# Patient Record
Sex: MALE | Race: WHITE | Employment: OTHER | URBAN - METROPOLITAN AREA
[De-identification: names, ages, dates, MRNs, and addresses within clinical notes are randomized per-mention and may not be internally consistent; named-entity substitution may affect disease eponyms.]

---

## 2017-02-23 RX ORDER — CARVEDILOL 6.25 MG/1
TABLET ORAL
Qty: 60 TABLET | Refills: 6 | Status: SHIPPED | OUTPATIENT
Start: 2017-02-23 | End: 2017-05-30 | Stop reason: SDUPTHER

## 2017-02-27 ENCOUNTER — OFFICE VISIT (OUTPATIENT)
Dept: CARDIOLOGY CLINIC | Age: 82
End: 2017-02-27

## 2017-02-27 VITALS
WEIGHT: 211 LBS | BODY MASS INDEX: 31.25 KG/M2 | OXYGEN SATURATION: 97 % | HEIGHT: 69 IN | HEART RATE: 70 BPM | DIASTOLIC BLOOD PRESSURE: 74 MMHG | SYSTOLIC BLOOD PRESSURE: 128 MMHG

## 2017-02-27 DIAGNOSIS — I63.9 CEREBRAL INFARCTION, UNSPECIFIED MECHANISM (HCC): ICD-10-CM

## 2017-02-27 DIAGNOSIS — I25.10 CORONARY ARTERY DISEASE INVOLVING NATIVE HEART WITHOUT ANGINA PECTORIS, UNSPECIFIED VESSEL OR LESION TYPE: ICD-10-CM

## 2017-02-27 DIAGNOSIS — I10 ESSENTIAL HYPERTENSION: ICD-10-CM

## 2017-02-27 DIAGNOSIS — E78.2 MIXED HYPERLIPIDEMIA: ICD-10-CM

## 2017-02-27 DIAGNOSIS — I35.0 NONRHEUMATIC AORTIC VALVE STENOSIS: Primary | ICD-10-CM

## 2017-02-27 DIAGNOSIS — I48.0 PAROXYSMAL ATRIAL FIBRILLATION (HCC): ICD-10-CM

## 2017-02-27 PROCEDURE — 4040F PNEUMOC VAC/ADMIN/RCVD: CPT | Performed by: NURSE PRACTITIONER

## 2017-02-27 PROCEDURE — 1036F TOBACCO NON-USER: CPT | Performed by: NURSE PRACTITIONER

## 2017-02-27 PROCEDURE — 93000 ELECTROCARDIOGRAM COMPLETE: CPT | Performed by: NURSE PRACTITIONER

## 2017-02-27 PROCEDURE — 1123F ACP DISCUSS/DSCN MKR DOCD: CPT | Performed by: NURSE PRACTITIONER

## 2017-02-27 PROCEDURE — G8417 CALC BMI ABV UP PARAM F/U: HCPCS | Performed by: NURSE PRACTITIONER

## 2017-02-27 PROCEDURE — G8484 FLU IMMUNIZE NO ADMIN: HCPCS | Performed by: NURSE PRACTITIONER

## 2017-02-27 PROCEDURE — G8598 ASA/ANTIPLAT THER USED: HCPCS | Performed by: NURSE PRACTITIONER

## 2017-02-27 PROCEDURE — G8427 DOCREV CUR MEDS BY ELIG CLIN: HCPCS | Performed by: NURSE PRACTITIONER

## 2017-02-27 PROCEDURE — 99214 OFFICE O/P EST MOD 30 MIN: CPT | Performed by: NURSE PRACTITIONER

## 2017-05-03 RX ORDER — FUROSEMIDE 20 MG/1
TABLET ORAL
Qty: 30 TABLET | Refills: 3 | Status: SHIPPED | OUTPATIENT
Start: 2017-05-03 | End: 2017-05-30 | Stop reason: SDUPTHER

## 2017-05-08 RX ORDER — ISOSORBIDE MONONITRATE 60 MG/1
TABLET, EXTENDED RELEASE ORAL
Qty: 90 TABLET | Refills: 3 | Status: SHIPPED | OUTPATIENT
Start: 2017-05-08 | End: 2018-07-25 | Stop reason: SDUPTHER

## 2017-05-18 ENCOUNTER — TELEPHONE (OUTPATIENT)
Dept: CARDIOLOGY CLINIC | Age: 82
End: 2017-05-18

## 2017-05-30 ENCOUNTER — OFFICE VISIT (OUTPATIENT)
Dept: CARDIOLOGY CLINIC | Age: 82
End: 2017-05-30

## 2017-05-30 VITALS
BODY MASS INDEX: 30.66 KG/M2 | HEART RATE: 70 BPM | HEIGHT: 69 IN | DIASTOLIC BLOOD PRESSURE: 84 MMHG | SYSTOLIC BLOOD PRESSURE: 112 MMHG | WEIGHT: 207 LBS | OXYGEN SATURATION: 94 %

## 2017-05-30 DIAGNOSIS — E78.2 MIXED HYPERLIPIDEMIA: ICD-10-CM

## 2017-05-30 DIAGNOSIS — I35.0 NONRHEUMATIC AORTIC VALVE STENOSIS: ICD-10-CM

## 2017-05-30 DIAGNOSIS — I48.0 PAROXYSMAL ATRIAL FIBRILLATION (HCC): Primary | ICD-10-CM

## 2017-05-30 DIAGNOSIS — I63.9 CEREBRAL INFARCTION, UNSPECIFIED MECHANISM (HCC): ICD-10-CM

## 2017-05-30 DIAGNOSIS — I25.10 CORONARY ARTERY DISEASE INVOLVING NATIVE HEART WITHOUT ANGINA PECTORIS, UNSPECIFIED VESSEL OR LESION TYPE: ICD-10-CM

## 2017-05-30 DIAGNOSIS — I10 ESSENTIAL HYPERTENSION: ICD-10-CM

## 2017-05-30 PROCEDURE — 1123F ACP DISCUSS/DSCN MKR DOCD: CPT | Performed by: NURSE PRACTITIONER

## 2017-05-30 PROCEDURE — 1036F TOBACCO NON-USER: CPT | Performed by: NURSE PRACTITIONER

## 2017-05-30 PROCEDURE — 4040F PNEUMOC VAC/ADMIN/RCVD: CPT | Performed by: NURSE PRACTITIONER

## 2017-05-30 PROCEDURE — G8427 DOCREV CUR MEDS BY ELIG CLIN: HCPCS | Performed by: NURSE PRACTITIONER

## 2017-05-30 PROCEDURE — G8598 ASA/ANTIPLAT THER USED: HCPCS | Performed by: NURSE PRACTITIONER

## 2017-05-30 PROCEDURE — G8417 CALC BMI ABV UP PARAM F/U: HCPCS | Performed by: NURSE PRACTITIONER

## 2017-05-30 PROCEDURE — 99214 OFFICE O/P EST MOD 30 MIN: CPT | Performed by: NURSE PRACTITIONER

## 2017-05-30 RX ORDER — FUROSEMIDE 20 MG/1
20 TABLET ORAL SEE ADMIN INSTRUCTIONS
Qty: 30 TABLET | Refills: 3 | Status: SHIPPED | OUTPATIENT
Start: 2017-05-30 | End: 2017-09-15 | Stop reason: SDUPTHER

## 2017-05-30 RX ORDER — CARVEDILOL 6.25 MG/1
TABLET ORAL
Qty: 60 TABLET | Refills: 6 | Status: SHIPPED | OUTPATIENT
Start: 2017-05-30 | End: 2018-04-26 | Stop reason: SDUPTHER

## 2017-08-18 ENCOUNTER — TELEPHONE (OUTPATIENT)
Dept: CARDIOLOGY CLINIC | Age: 82
End: 2017-08-18

## 2017-08-31 ENCOUNTER — OFFICE VISIT (OUTPATIENT)
Dept: CARDIOLOGY CLINIC | Age: 82
End: 2017-08-31

## 2017-08-31 VITALS
HEIGHT: 68 IN | HEART RATE: 64 BPM | OXYGEN SATURATION: 93 % | BODY MASS INDEX: 32.28 KG/M2 | SYSTOLIC BLOOD PRESSURE: 124 MMHG | WEIGHT: 213 LBS | DIASTOLIC BLOOD PRESSURE: 64 MMHG

## 2017-08-31 DIAGNOSIS — I48.0 PAROXYSMAL ATRIAL FIBRILLATION (HCC): ICD-10-CM

## 2017-08-31 DIAGNOSIS — I35.0 NONRHEUMATIC AORTIC VALVE STENOSIS: Primary | ICD-10-CM

## 2017-08-31 DIAGNOSIS — I10 ESSENTIAL HYPERTENSION: ICD-10-CM

## 2017-08-31 DIAGNOSIS — I63.9 CEREBRAL INFARCTION, UNSPECIFIED MECHANISM (HCC): ICD-10-CM

## 2017-08-31 DIAGNOSIS — E78.2 MIXED HYPERLIPIDEMIA: ICD-10-CM

## 2017-08-31 DIAGNOSIS — I25.10 CORONARY ARTERY DISEASE INVOLVING NATIVE HEART WITHOUT ANGINA PECTORIS, UNSPECIFIED VESSEL OR LESION TYPE: ICD-10-CM

## 2017-08-31 PROCEDURE — G8417 CALC BMI ABV UP PARAM F/U: HCPCS | Performed by: NURSE PRACTITIONER

## 2017-08-31 PROCEDURE — 1036F TOBACCO NON-USER: CPT | Performed by: NURSE PRACTITIONER

## 2017-08-31 PROCEDURE — 1123F ACP DISCUSS/DSCN MKR DOCD: CPT | Performed by: NURSE PRACTITIONER

## 2017-08-31 PROCEDURE — G8427 DOCREV CUR MEDS BY ELIG CLIN: HCPCS | Performed by: NURSE PRACTITIONER

## 2017-08-31 PROCEDURE — 99213 OFFICE O/P EST LOW 20 MIN: CPT | Performed by: NURSE PRACTITIONER

## 2017-08-31 PROCEDURE — G8598 ASA/ANTIPLAT THER USED: HCPCS | Performed by: NURSE PRACTITIONER

## 2017-08-31 PROCEDURE — 4040F PNEUMOC VAC/ADMIN/RCVD: CPT | Performed by: NURSE PRACTITIONER

## 2017-09-15 RX ORDER — FUROSEMIDE 20 MG/1
TABLET ORAL
Qty: 30 TABLET | Refills: 3 | Status: SHIPPED | OUTPATIENT
Start: 2017-09-15 | End: 2018-06-15 | Stop reason: SDUPTHER

## 2017-10-12 RX ORDER — PRAVASTATIN SODIUM 40 MG
TABLET ORAL
Qty: 90 TABLET | Refills: 2 | Status: SHIPPED | OUTPATIENT
Start: 2017-10-12 | End: 2017-10-16 | Stop reason: SDUPTHER

## 2017-10-16 ENCOUNTER — TELEPHONE (OUTPATIENT)
Dept: CARDIOLOGY CLINIC | Age: 82
End: 2017-10-16

## 2017-10-16 RX ORDER — PRAVASTATIN SODIUM 40 MG
40 TABLET ORAL DAILY
Qty: 90 TABLET | Refills: 3 | Status: SHIPPED | OUTPATIENT
Start: 2017-10-16 | End: 2017-12-02 | Stop reason: SDUPTHER

## 2017-11-30 ENCOUNTER — OFFICE VISIT (OUTPATIENT)
Dept: CARDIOLOGY CLINIC | Age: 82
End: 2017-11-30

## 2017-11-30 VITALS
WEIGHT: 214 LBS | DIASTOLIC BLOOD PRESSURE: 80 MMHG | SYSTOLIC BLOOD PRESSURE: 140 MMHG | OXYGEN SATURATION: 97 % | HEART RATE: 72 BPM | BODY MASS INDEX: 32.54 KG/M2

## 2017-11-30 DIAGNOSIS — I48.0 PAROXYSMAL ATRIAL FIBRILLATION (HCC): ICD-10-CM

## 2017-11-30 DIAGNOSIS — I10 ESSENTIAL HYPERTENSION: ICD-10-CM

## 2017-11-30 DIAGNOSIS — E78.2 MIXED HYPERLIPIDEMIA: ICD-10-CM

## 2017-11-30 DIAGNOSIS — I25.10 CORONARY ARTERY DISEASE INVOLVING NATIVE HEART WITHOUT ANGINA PECTORIS, UNSPECIFIED VESSEL OR LESION TYPE: Primary | ICD-10-CM

## 2017-11-30 DIAGNOSIS — I35.0 NONRHEUMATIC AORTIC VALVE STENOSIS: ICD-10-CM

## 2017-11-30 PROCEDURE — 99214 OFFICE O/P EST MOD 30 MIN: CPT | Performed by: NURSE PRACTITIONER

## 2017-11-30 PROCEDURE — G8598 ASA/ANTIPLAT THER USED: HCPCS | Performed by: NURSE PRACTITIONER

## 2017-11-30 PROCEDURE — G8484 FLU IMMUNIZE NO ADMIN: HCPCS | Performed by: NURSE PRACTITIONER

## 2017-11-30 PROCEDURE — G8427 DOCREV CUR MEDS BY ELIG CLIN: HCPCS | Performed by: NURSE PRACTITIONER

## 2017-11-30 PROCEDURE — 4040F PNEUMOC VAC/ADMIN/RCVD: CPT | Performed by: NURSE PRACTITIONER

## 2017-11-30 PROCEDURE — 1036F TOBACCO NON-USER: CPT | Performed by: NURSE PRACTITIONER

## 2017-11-30 PROCEDURE — G8417 CALC BMI ABV UP PARAM F/U: HCPCS | Performed by: NURSE PRACTITIONER

## 2017-11-30 PROCEDURE — 1123F ACP DISCUSS/DSCN MKR DOCD: CPT | Performed by: NURSE PRACTITIONER

## 2017-11-30 NOTE — LETTER
Medication Sig Dispense Refill    pravastatin (PRAVACHOL) 40 MG tablet Take 1 tablet by mouth daily 90 tablet 3    furosemide (LASIX) 20 MG tablet TAKE 1 TABLET BY MOUTH SEE ADMIN INSTRUCTIONS TAKE 1 TABLET MONDAY, WED, AND FRIDAY 30 tablet 3    carvedilol (COREG) 6.25 MG tablet TAKE 1 TABLET BY MOUTH 2 TIMES DAILY (WITH MEALS) 60 tablet 6    isosorbide mononitrate (IMDUR) 60 MG extended release tablet TAKE 1 TABLET BY MOUTH DAILY 90 tablet 3    nitroGLYCERIN (NITROSTAT) 0.4 MG SL tablet Place 1 tablet under the tongue every 5 minutes as needed for Chest pain 25 tablet 3    DiphenhydrAMINE HCl (BENADRYL PO) Take by mouth nightly as needed       Multiple Vitamins-Minerals (PRESERVISION AREDS 2) CAPS Take  by mouth 2 times daily.  albuterol (PROAIR HFA) 108 (90 BASE) MCG/ACT inhaler Inhale 2 puffs into the lungs every 6 hours as needed for Wheezing.  loratadine (CLARITIN) 10 MG tablet Take 10 mg by mouth daily.  vitamin B-12 (CYANOCOBALAMIN) 1000 MCG tablet Take 1,000 mcg by mouth daily.  clopidogrel (PLAVIX) 75 MG tablet Take 75 mg by mouth daily.  insulin aspart protamine-insulin aspart (NOVOLOG 70/30) (70-30) 100 UNIT/ML injection Inject 20 Units into the skin 2 times daily (with meals).  acetaminophen (TYLENOL) 500 MG tablet Take 500 mg by mouth 2 tabs bid      aspirin 81 MG EC tablet Take 81 mg by mouth daily.  glipiZIDE (GLUCOTROL) 5 MG tablet Take 1 tablet by mouth 2 times daily (before meals). 60 tablet 6    Ciprofloxacin-Dexamethasone (CIPRODEX OT) Place 7.5 mLs in ear(s) Indications: taking 1 week       No current facility-administered medications for this visit. REVIEW OF SYSTEMS:   CONSTITUTIONAL: No major weight gain or loss, fatigue, weakness, night sweats or fever. There's been no change in energy level, sleep pattern, or activity level. HEENT: No new vision difficulties or ringing in the ears. RESPIRATORY: No new SOB, PND, orthopnea or cough. CARDIOVASCULAR: See HPI  GI: No nausea, vomiting, diarrhea, constipation, abdominal pain or changes in bowel habits. : No urinary frequency, urgency, incontinence hematuria or dysuria. SKIN: No cyanosis or skin lesions. MUSCULOSKELETAL: No new muscle or joint pain. NEUROLOGICAL: No syncope or TIA-like symptoms. PSYCHIATRIC: No anxiety, pain, insomnia or depression    Objective:   PHYSICAL EXAM:        VITALS:  BP (!) 140/80   Pulse 72   Wt 214 lb (97.1 kg)   SpO2 97%   BMI 32.54 kg/m²      CONSTITUTIONAL: Cooperative, no apparent distress, and appears well nourished / developed  NEUROLOGIC:  Awake and orientated to person, place and time. PSYCH: Calm affect. SKIN: Warm and dry. HEENT: Sclera non-icteric, normocephalic, neck supple, no elevation of JVP, normal carotid pulses with no bruits and thyroid normal size. LUNGS:  No increased work of breathing and clear to auscultation, no crackles or wheezing. CARDIOVASCULAR:  Regular rate and rhythm with no gallops, rubs, or abnormal heart sounds, normal PMI. The apical impulses not displaced. Heart tones are crisp and normal. Cervical veins are not engorged , + JACKLYN 2/6               JVP less than 8 cm H2O                                                                              The carotid upstroke is normal in amplitude and contour without delay or bruit    ABDOMEN:  Normal bowel sounds, non-distended and non-tender to palpation   EXT: No edema, no calf tenderness. Pulses are present bilaterally.     DATA:    Lab Results   Component Value Date    ALT 17 02/05/2016    AST 79 (H) 02/05/2016    ALKPHOS 66 02/05/2016    BILITOT 0.7 02/05/2016     Lab Results   Component Value Date    CREATININE 0.9 02/22/2016    BUN 20 02/22/2016     02/22/2016    K 4.4 02/22/2016     02/22/2016    CO2 23 02/22/2016     Lab Results   Component Value Date    TSH 6.68 (H) 02/17/2012

## 2017-12-01 NOTE — COMMUNICATION BODY
[address]     Outpatient Follow Up Note    CHIEF COMPLAINT / HPI: Follow Up secondary to CAD, Aortic stenosis,  HTN, Hyperlipidemia, atrial fibrillation, and CVA     Jose Roberto Kennedy is 80 y.o. male who presents today for a routine follow up  related to the above mentioned issues. Subjective:   Since the time of last office visit, the patient admits their symptoms have not changed   Patient's has a history of CAD, Aortic stenosis,  HTN, Hyperlipidemia, atrial fibrillation, and CVA   At today's visit patient is doing well. He denies any chest pain, palpitations, dizziness, or edema. His SOB is stable and denies any new changes. These symptoms show no change since the last office visit. With regard to medication therapy the patient has been compliant with prescribed regimen. They have tolerated therapy to date. Past Medical History:   Diagnosis Date    CAD (coronary artery disease) calcifications    2004 MI    Cancer ([facility] [address]     hands and face, left ear squamous cell    Chronic diastolic heart failure (HCC)     Diabetes type 2, controlled ([facility] [address]     Hyperlipidemia     Hypertension     Obesity (BMI 30-39. 9)     Unspecified cerebral artery occlusion with cerebral infarction     CVA 4-2001 no residual     Social History:    History   Smoking Status    Never Smoker   Smokeless Tobacco    Never Used     Current Medications:  Current Outpatient Prescriptions   Medication Sig Dispense Refill    pravastatin (PRAVACHOL) 40 MG tablet Take 1 tablet by mouth daily 90 tablet 3    furosemide (LASIX) 20 MG tablet TAKE 1 TABLET BY MOUTH SEE ADMIN INSTRUCTIONS TAKE 1 TABLET MONDAY, WED, AND FRIDAY 30 tablet 3    carvedilol (COREG) 6.25 MG tablet TAKE 1 TABLET BY MOUTH 2 TIMES DAILY (WITH MEALS) 60 tablet 6    isosorbide mononitrate (IMDUR) 60 MG extended release tablet TAKE 1 TABLET BY MOUTH DAILY 90 tablet 3    nitroGLYCERIN (NITROSTAT) 0.4 MG SL tablet Place 1 tablet under the tongue every 5 minutes

## 2017-12-01 NOTE — PROGRESS NOTES
[address]     Outpatient Follow Up Note    CHIEF COMPLAINT / HPI: Follow Up secondary to CAD, Aortic stenosis,  HTN, Hyperlipidemia, atrial fibrillation, and CVA     Dennis Watson is 80 y.o. male who presents today for a routine follow up  related to the above mentioned issues. Subjective:   Since the time of last office visit, the patient admits their symptoms have not changed   Patient's has a history of CAD, Aortic stenosis,  HTN, Hyperlipidemia, atrial fibrillation, and CVA   At today's visit patient is doing well. He denies any chest pain, palpitations, dizziness, or edema. His SOB is stable and denies any new changes. These symptoms show no change since the last office visit. With regard to medication therapy the patient has been compliant with prescribed regimen. They have tolerated therapy to date. Past Medical History:   Diagnosis Date    CAD (coronary artery disease) calcifications    2004 MI    Cancer ([facility] [address]     hands and face, left ear squamous cell    Chronic diastolic heart failure (HCC)     Diabetes type 2, controlled ([facility] [address]     Hyperlipidemia     Hypertension     Obesity (BMI 30-39. 9)     Unspecified cerebral artery occlusion with cerebral infarction     CVA 4-2001 no residual     Social History:    History   Smoking Status    Never Smoker   Smokeless Tobacco    Never Used     Current Medications:  Current Outpatient Prescriptions   Medication Sig Dispense Refill    pravastatin (PRAVACHOL) 40 MG tablet Take 1 tablet by mouth daily 90 tablet 3    furosemide (LASIX) 20 MG tablet TAKE 1 TABLET BY MOUTH SEE ADMIN INSTRUCTIONS TAKE 1 TABLET MONDAY, WED, AND FRIDAY 30 tablet 3    carvedilol (COREG) 6.25 MG tablet TAKE 1 TABLET BY MOUTH 2 TIMES DAILY (WITH MEALS) 60 tablet 6    isosorbide mononitrate (IMDUR) 60 MG extended release tablet TAKE 1 TABLET BY MOUTH DAILY 90 tablet 3    nitroGLYCERIN (NITROSTAT) 0.4 MG SL tablet Place 1 tablet under the tongue every 5 minutes apparent distress, and appears well nourished / developed  NEUROLOGIC:  Awake and orientated to person, place and time. PSYCH: Calm affect. SKIN: Warm and dry. HEENT: Sclera non-icteric, normocephalic, neck supple, no elevation of JVP, normal carotid pulses with no bruits and thyroid normal size. LUNGS:  No increased work of breathing and clear to auscultation, no crackles or wheezing. CARDIOVASCULAR:  Regular rate and rhythm with no gallops, rubs, or abnormal heart sounds, normal PMI. The apical impulses not displaced. Heart tones are crisp and normal. Cervical veins are not engorged , + JACKLYN 2/6               JVP less than 8 cm H2O                                                                              The carotid upstroke is normal in amplitude and contour without delay or bruit    ABDOMEN:  Normal bowel sounds, non-distended and non-tender to palpation   EXT: No edema, no calf tenderness. Pulses are present bilaterally.     DATA:    Lab Results   Component Value Date    ALT 17 02/05/2016    AST 79 (H) 02/05/2016    ALKPHOS 66 02/05/2016    BILITOT 0.7 02/05/2016     Lab Results   Component Value Date    CREATININE 0.9 02/22/2016    BUN 20 02/22/2016     02/22/2016    K 4.4 02/22/2016     02/22/2016    CO2 23 02/22/2016     Lab Results   Component Value Date    TSH 6.68 (H) 02/17/2012    [id number] 5.6 02/17/2012     Lab Results   Component Value Date    WBC 8.8 02/07/2016    HGB 14.7 02/07/2016    HCT 43.8 02/07/2016    MCV 89.0 02/07/2016     02/07/2016     No components found for: CHLPL  Lab Results   Component Value Date    TRIG 218 (H) 02/06/2016     Lab Results   Component Value Date    HDL 35 (L) 02/06/2016     Lab Results   Component Value Date    LDLCALC 68 02/06/2016     :    Assessment:       ~CAD   Date Result   Sx  Denies any chest pain   Hx 6/11 CABG   LHC 2/12 2/16 SVG grafts occluded, LIMA patent  LIMA to LAD patent, medical management Terrell Fish 19 CNP

## 2017-12-04 RX ORDER — PRAVASTATIN SODIUM 40 MG
TABLET ORAL
Qty: 90 TABLET | Refills: 0 | Status: SHIPPED | OUTPATIENT
Start: 2017-12-04 | End: 2018-06-06 | Stop reason: SDUPTHER

## 2018-02-12 ENCOUNTER — TELEPHONE (OUTPATIENT)
Dept: CARDIOLOGY CLINIC | Age: 83
End: 2018-02-12

## 2018-03-08 ENCOUNTER — OFFICE VISIT (OUTPATIENT)
Dept: CARDIOLOGY CLINIC | Age: 83
End: 2018-03-08

## 2018-03-08 VITALS
HEIGHT: 68 IN | DIASTOLIC BLOOD PRESSURE: 82 MMHG | BODY MASS INDEX: 32.89 KG/M2 | OXYGEN SATURATION: 97 % | SYSTOLIC BLOOD PRESSURE: 128 MMHG | HEART RATE: 62 BPM | WEIGHT: 217 LBS

## 2018-03-08 DIAGNOSIS — E78.2 MIXED HYPERLIPIDEMIA: ICD-10-CM

## 2018-03-08 DIAGNOSIS — I63.9 CEREBRAL INFARCTION, UNSPECIFIED MECHANISM (HCC): ICD-10-CM

## 2018-03-08 DIAGNOSIS — I48.0 PAROXYSMAL ATRIAL FIBRILLATION (HCC): ICD-10-CM

## 2018-03-08 DIAGNOSIS — I35.0 NONRHEUMATIC AORTIC VALVE STENOSIS: Primary | ICD-10-CM

## 2018-03-08 DIAGNOSIS — I10 ESSENTIAL HYPERTENSION: ICD-10-CM

## 2018-03-08 DIAGNOSIS — I25.10 CORONARY ARTERY DISEASE INVOLVING NATIVE HEART WITHOUT ANGINA PECTORIS, UNSPECIFIED VESSEL OR LESION TYPE: ICD-10-CM

## 2018-03-08 PROCEDURE — 1123F ACP DISCUSS/DSCN MKR DOCD: CPT | Performed by: NURSE PRACTITIONER

## 2018-03-08 PROCEDURE — 1036F TOBACCO NON-USER: CPT | Performed by: NURSE PRACTITIONER

## 2018-03-08 PROCEDURE — G8484 FLU IMMUNIZE NO ADMIN: HCPCS | Performed by: NURSE PRACTITIONER

## 2018-03-08 PROCEDURE — G8417 CALC BMI ABV UP PARAM F/U: HCPCS | Performed by: NURSE PRACTITIONER

## 2018-03-08 PROCEDURE — G8598 ASA/ANTIPLAT THER USED: HCPCS | Performed by: NURSE PRACTITIONER

## 2018-03-08 PROCEDURE — 99214 OFFICE O/P EST MOD 30 MIN: CPT | Performed by: NURSE PRACTITIONER

## 2018-03-08 PROCEDURE — G8427 DOCREV CUR MEDS BY ELIG CLIN: HCPCS | Performed by: NURSE PRACTITIONER

## 2018-03-08 PROCEDURE — 4040F PNEUMOC VAC/ADMIN/RCVD: CPT | Performed by: NURSE PRACTITIONER

## 2018-03-08 NOTE — COMMUNICATION BODY
tablet 3    nitroGLYCERIN (NITROSTAT) 0.4 MG SL tablet Place 1 tablet under the tongue every 5 minutes as needed for Chest pain 25 tablet 3    DiphenhydrAMINE HCl (BENADRYL PO) Take by mouth nightly as needed       Multiple Vitamins-Minerals (PRESERVISION AREDS 2) CAPS Take  by mouth 2 times daily.  albuterol (PROAIR HFA) 108 (90 BASE) MCG/ACT inhaler Inhale 2 puffs into the lungs every 6 hours as needed for Wheezing.  loratadine (CLARITIN) 10 MG tablet Take 10 mg by mouth daily.  vitamin B-12 (CYANOCOBALAMIN) 1000 MCG tablet Take 1,000 mcg by mouth daily.  clopidogrel (PLAVIX) 75 MG tablet Take 75 mg by mouth daily.  insulin aspart protamine-insulin aspart (NOVOLOG 70/30) (70-30) 100 UNIT/ML injection Inject 20 Units into the skin 2 times daily (with meals).  aspirin 81 MG EC tablet Take 81 mg by mouth daily.  glipiZIDE (GLUCOTROL) 5 MG tablet Take 1 tablet by mouth 2 times daily (before meals). 60 tablet 6    acetaminophen (TYLENOL) 500 MG tablet Take 500 mg by mouth 2 tabs bid       No current facility-administered medications for this visit. REVIEW OF SYSTEMS:   CONSTITUTIONAL: No major weight gain or loss, fatigue, weakness, night sweats or fever. There's been no change in energy level, sleep pattern, or activity level. HEENT: No new vision difficulties or ringing in the ears. RESPIRATORY: No new SOB, PND, orthopnea or cough. CARDIOVASCULAR: See HPI  GI: No nausea, vomiting, diarrhea, constipation, abdominal pain or changes in bowel habits. : No urinary frequency, urgency, incontinence hematuria or dysuria. SKIN: No cyanosis or skin lesions. MUSCULOSKELETAL: No new muscle or joint pain. NEUROLOGICAL: No syncope or TIA-like symptoms.   PSYCHIATRIC: No anxiety, pain, insomnia or depression    Objective:   PHYSICAL EXAM:        VITALS:    Vitals:    03/08/18 1034   BP: 128/82   Pulse: 62   SpO2: 97%         CONSTITUTIONAL: Cooperative, no apparent distress, and appears well nourished / developed  NEUROLOGIC:  Awake and orientated to person, place and time. PSYCH: Calm affect. SKIN: Warm and dry. HEENT: Sclera non-icteric, normocephalic, neck supple, no elevation of JVP, normal carotid pulses with no bruits and thyroid normal size. LUNGS:  No increased work of breathing and clear to auscultation, no crackles or wheezing. CARDIOVASCULAR:  Regular rate and rhythm with no gallops, rubs, or abnormal heart sounds, normal PMI. The apical impulses not displaced. Heart tones are crisp and normal. Cervical veins are not engorged , + JACKLYN 2/6               JVP less than 8 cm H2O                                                                              The carotid upstroke is normal in amplitude and contour without delay or bruit    ABDOMEN:  Normal bowel sounds, non-distended and non-tender to palpation   EXT: No edema, no calf tenderness. Pulses are present bilaterally.     DATA:    Lab Results   Component Value Date    ALT 17 02/05/2016    AST 79 (H) 02/05/2016    ALKPHOS 66 02/05/2016    BILITOT 0.7 02/05/2016     Lab Results   Component Value Date    CREATININE 0.9 02/22/2016    BUN 20 02/22/2016     02/22/2016    K 4.4 02/22/2016     02/22/2016    CO2 23 02/22/2016     Lab Results   Component Value Date    TSH 6.68 (H) 02/17/2012    [id number] 5.6 02/17/2012     Lab Results   Component Value Date    WBC 8.8 02/07/2016    HGB 14.7 02/07/2016    HCT 43.8 02/07/2016    MCV 89.0 02/07/2016     02/07/2016     No components found for: CHLPL  Lab Results   Component Value Date    TRIG 218 (H) 02/06/2016     Lab Results   Component Value Date    HDL 35 (L) 02/06/2016     Lab Results   Component Value Date    LDLCALC 68 02/06/2016     :    Assessment:       ~CAD   Date Result   Sx  Denies any chest pain   Hx 6/11 CABG   LHC 2/12 2/16 SVG grafts occluded, LIMA patent  LIMA to LAD patent, medical management   Cardionet 5/12 normal   TTE      MPI 2/12  10/14  8/16  8/9/16 45%  55%, mild MR  50-55% trace AI  EF 40% small sized anterospetal fixed defect c/w infarction in the territory of the prox LAD, moderat large sized inferolateral completely reversible defect c/w inschemia   Meds  Reviewed   Plan  Continue aggressive medical therapy and risk factor modification  Continue current doses coreg, imdur, pravastatin, lasix,  nitro       ~ Aortic Stenosis     6/11 Bio AVR     TTE: 55%, MG 15, 8/16: EF 55%, MG 12     Continue same regimen     ~ Hypertension  Continue current medical regimen: Coreg 6.25 mg BID, and Imdur 60 mg daily     Today's B/P- 128/82    Continue current medical regimen: Coreg 6.25 mg BID, and Imdur 60 mg daily    ~ Hyperlipidemia      2/6/16: HDL: 35, LDL: 68     On Pravastatin 40 mg daily    ~ Atrial Fibrillation     Postop open heart surgery, resolved    No recurrence     ~ CVA    Hx:  5/15/17:post traumatic subdural hematoma and a CVA   CVA 10/11 Residual right weakness, riser right foot    Sx:     No further neuro concerns    Plan:  Continued observation      Patient  is stable since last office visit    Plan:   Continue current medications  Labs followed per PCP  Follow up in four months    I have addressed the patient's cardiac risk factors and adjusted pharmacologic treatment as needed. In addition, I have reinforced the need for patient directed risk factor modification. Further evaluation will be based upon the patient's clinical course and testing results. All questions and concerns were addressed to the patient/family. Alternatives to  treatment were discussed. The patient  currently  is not smoking. The risks related to smoking were reviewed with the patient. Recommend maintaining a smoke-free lifestyle. Products available for smoking cessation were discussed. Thank you for allowing to us to participate in the care of Altagracia Landon [facility]

## 2018-03-08 NOTE — LETTER
 pravastatin (PRAVACHOL) 40 MG tablet TAKE 1 TABLETS BY MOUTH DAILY 90 tablet 0    furosemide (LASIX) 20 MG tablet TAKE 1 TABLET BY MOUTH SEE ADMIN INSTRUCTIONS TAKE 1 TABLET MONDAY, WED, AND FRIDAY 30 tablet 3    Ciprofloxacin-Dexamethasone (CIPRODEX OT) Place 7.5 mLs in ear(s) Indications: taking 1 week      carvedilol (COREG) 6.25 MG tablet TAKE 1 TABLET BY MOUTH 2 TIMES DAILY (WITH MEALS) 60 tablet 6    isosorbide mononitrate (IMDUR) 60 MG extended release tablet TAKE 1 TABLET BY MOUTH DAILY 90 tablet 3    nitroGLYCERIN (NITROSTAT) 0.4 MG SL tablet Place 1 tablet under the tongue every 5 minutes as needed for Chest pain 25 tablet 3    DiphenhydrAMINE HCl (BENADRYL PO) Take by mouth nightly as needed       Multiple Vitamins-Minerals (PRESERVISION AREDS 2) CAPS Take  by mouth 2 times daily.  albuterol (PROAIR HFA) 108 (90 BASE) MCG/ACT inhaler Inhale 2 puffs into the lungs every 6 hours as needed for Wheezing.  loratadine (CLARITIN) 10 MG tablet Take 10 mg by mouth daily.  vitamin B-12 (CYANOCOBALAMIN) 1000 MCG tablet Take 1,000 mcg by mouth daily.  clopidogrel (PLAVIX) 75 MG tablet Take 75 mg by mouth daily.  insulin aspart protamine-insulin aspart (NOVOLOG 70/30) (70-30) 100 UNIT/ML injection Inject 20 Units into the skin 2 times daily (with meals).  aspirin 81 MG EC tablet Take 81 mg by mouth daily.  glipiZIDE (GLUCOTROL) 5 MG tablet Take 1 tablet by mouth 2 times daily (before meals). 60 tablet 6    acetaminophen (TYLENOL) 500 MG tablet Take 500 mg by mouth 2 tabs bid       No current facility-administered medications for this visit. REVIEW OF SYSTEMS:   CONSTITUTIONAL: No major weight gain or loss, fatigue, weakness, night sweats or fever. There's been no change in energy level, sleep pattern, or activity level. HEENT: No new vision difficulties or ringing in the ears. RESPIRATORY: No new SOB, PND, orthopnea or cough.    CARDIOVASCULAR: See HPI GI: No nausea, vomiting, diarrhea, constipation, abdominal pain or changes in bowel habits. : No urinary frequency, urgency, incontinence hematuria or dysuria. SKIN: No cyanosis or skin lesions. MUSCULOSKELETAL: No new muscle or joint pain. NEUROLOGICAL: No syncope or TIA-like symptoms. PSYCHIATRIC: No anxiety, pain, insomnia or depression    Objective:   PHYSICAL EXAM:        VITALS:    Vitals:    03/08/18 1034   BP: 128/82   Pulse: 62   SpO2: 97%         CONSTITUTIONAL: Cooperative, no apparent distress, and appears well nourished / developed  NEUROLOGIC:  Awake and orientated to person, place and time. PSYCH: Calm affect. SKIN: Warm and dry. HEENT: Sclera non-icteric, normocephalic, neck supple, no elevation of JVP, normal carotid pulses with no bruits and thyroid normal size. LUNGS:  No increased work of breathing and clear to auscultation, no crackles or wheezing. CARDIOVASCULAR:  Regular rate and rhythm with no gallops, rubs, or abnormal heart sounds, normal PMI. The apical impulses not displaced. Heart tones are crisp and normal. Cervical veins are not engorged , + JACKLYN 2/6               JVP less than 8 cm H2O                                                                              The carotid upstroke is normal in amplitude and contour without delay or bruit    ABDOMEN:  Normal bowel sounds, non-distended and non-tender to palpation   EXT: No edema, no calf tenderness. Pulses are present bilaterally.     DATA:    Lab Results   Component Value Date    ALT 17 02/05/2016    AST 79 (H) 02/05/2016    ALKPHOS 66 02/05/2016    BILITOT 0.7 02/05/2016     Lab Results   Component Value Date    CREATININE 0.9 02/22/2016    BUN 20 02/22/2016     02/22/2016    K 4.4 02/22/2016     02/22/2016    CO2 23 02/22/2016     Lab Results   Component Value Date    TSH 6.68 (H) 02/17/2012    [id number] 5.6 02/17/2012     Lab Results   Component Value Date    WBC 8.8 02/07/2016    HGB 14.7 02/07/2016 Further evaluation will be based upon the patient's clinical course and testing results. All questions and concerns were addressed to the patient/family. Alternatives to  treatment were discussed. The patient  currently  is not smoking. The risks related to smoking were reviewed with the patient. Recommend maintaining a smoke-free lifestyle. Products available for smoking cessation were discussed. Thank you for allowing to us to participate in the care of Erasmo Wisdom. [address]  [facility]         If you have questions, please do not hesitate to call me. I look forward to following Judithann July along with you.     Sincerely,        Maggie Rashid NP

## 2018-03-08 NOTE — PROGRESS NOTES
[address]     Outpatient Follow Up Note    CHIEF COMPLAINT / HPI: Follow Up secondary to CAD, Aortic stenosis,  HTN, Hyperlipidemia, atrial fibrillation, and CVA     Anuj Curran is 80 y.o. male who presents today for a routine follow up  related to the above mentioned issues. Subjective:   Since the time of last office visit, the patient admits their symptoms have not changed   Patient's has a history of CAD, Aortic stenosis,  HTN, Hyperlipidemia, atrial fibrillation, and CVA   At today's visit patient is doing well. He denies any chest pain, palpitations,  or edema. His SOB and dizziness is stable and denies any new changes. These symptoms show no change since the last office visit. With regard to medication therapy the patient has been compliant with prescribed regimen. They have tolerated therapy to date. Past Medical History:   Diagnosis Date    CAD (coronary artery disease) calcifications    2004 MI    Cancer ([facility] [address]     hands and face, left ear squamous cell    Chronic diastolic heart failure (HCC)     Diabetes type 2, controlled ([facility] [address]     Hyperlipidemia     Hypertension     Obesity (BMI 30-39. 9)     Unspecified cerebral artery occlusion with cerebral infarction     CVA 4-2001 no residual     Social History:    History   Smoking Status    Never Smoker   Smokeless Tobacco    Never Used     Current Medications:  Current Outpatient Prescriptions   Medication Sig Dispense Refill    pravastatin (PRAVACHOL) 40 MG tablet TAKE 1 TABLETS BY MOUTH DAILY 90 tablet 0    furosemide (LASIX) 20 MG tablet TAKE 1 TABLET BY MOUTH SEE ADMIN INSTRUCTIONS TAKE 1 TABLET MONDAY, WED, AND FRIDAY 30 tablet 3    Ciprofloxacin-Dexamethasone (CIPRODEX OT) Place 7.5 mLs in ear(s) Indications: taking 1 week      carvedilol (COREG) 6.25 MG tablet TAKE 1 TABLET BY MOUTH 2 TIMES DAILY (WITH MEALS) 60 tablet 6    isosorbide mononitrate (IMDUR) 60 MG extended release tablet TAKE 1 TABLET BY MOUTH DAILY 90 2/12  10/14  8/16  8/9/16 45%  55%, mild MR  50-55% trace AI  EF 40% small sized anterospetal fixed defect c/w infarction in the territory of the prox LAD, moderat large sized inferolateral completely reversible defect c/w inschemia   Meds  Reviewed   Plan  Continue aggressive medical therapy and risk factor modification  Continue current doses coreg, imdur, pravastatin, lasix,  nitro       ~ Aortic Stenosis     6/11 Bio AVR     TTE: 55%, MG 15, 8/16: EF 55%, MG 12     Continue same regimen     ~ Hypertension  Continue current medical regimen: Coreg 6.25 mg BID, and Imdur 60 mg daily     Today's B/P- 128/82    Continue current medical regimen: Coreg 6.25 mg BID, and Imdur 60 mg daily    ~ Hyperlipidemia      2/6/16: HDL: 35, LDL: 68     On Pravastatin 40 mg daily    ~ Atrial Fibrillation     Postop open heart surgery, resolved    No recurrence     ~ CVA    Hx:  5/15/17:post traumatic subdural hematoma and a CVA   CVA 10/11 Residual right weakness, riser right foot    Sx:     No further neuro concerns    Plan:  Continued observation      Patient  is stable since last office visit    Plan:   Continue current medications  Labs followed per PCP  Follow up in four months    I have addressed the patient's cardiac risk factors and adjusted pharmacologic treatment as needed. In addition, I have reinforced the need for patient directed risk factor modification. Further evaluation will be based upon the patient's clinical course and testing results. All questions and concerns were addressed to the patient/family. Alternatives to  treatment were discussed. The patient  currently  is not smoking. The risks related to smoking were reviewed with the patient. Recommend maintaining a smoke-free lifestyle. Products available for smoking cessation were discussed. Thank you for allowing to us to [facility]

## 2018-04-26 RX ORDER — CARVEDILOL 6.25 MG/1
TABLET ORAL
Qty: 60 TABLET | Refills: 6 | Status: SHIPPED | OUTPATIENT
Start: 2018-04-26 | End: 2018-06-18 | Stop reason: SDUPTHER

## 2018-06-06 RX ORDER — PRAVASTATIN SODIUM 40 MG
TABLET ORAL
Qty: 90 TABLET | Refills: 2 | Status: SHIPPED | OUTPATIENT
Start: 2018-06-06 | End: 2018-07-05

## 2018-06-12 ENCOUNTER — OFFICE VISIT (OUTPATIENT)
Dept: CARDIOLOGY CLINIC | Age: 83
End: 2018-06-12

## 2018-06-12 VITALS
WEIGHT: 214.4 LBS | SYSTOLIC BLOOD PRESSURE: 110 MMHG | DIASTOLIC BLOOD PRESSURE: 66 MMHG | HEIGHT: 68 IN | OXYGEN SATURATION: 97 % | HEART RATE: 57 BPM | BODY MASS INDEX: 32.49 KG/M2

## 2018-06-12 DIAGNOSIS — I25.10 CORONARY ARTERY DISEASE INVOLVING NATIVE HEART WITHOUT ANGINA PECTORIS, UNSPECIFIED VESSEL OR LESION TYPE: Primary | ICD-10-CM

## 2018-06-12 DIAGNOSIS — E78.2 MIXED HYPERLIPIDEMIA: ICD-10-CM

## 2018-06-12 DIAGNOSIS — I35.0 NONRHEUMATIC AORTIC VALVE STENOSIS: ICD-10-CM

## 2018-06-12 DIAGNOSIS — I10 ESSENTIAL HYPERTENSION: ICD-10-CM

## 2018-06-12 DIAGNOSIS — I48.0 PAROXYSMAL ATRIAL FIBRILLATION (HCC): ICD-10-CM

## 2018-06-12 PROCEDURE — G8598 ASA/ANTIPLAT THER USED: HCPCS | Performed by: NURSE PRACTITIONER

## 2018-06-12 PROCEDURE — 1123F ACP DISCUSS/DSCN MKR DOCD: CPT | Performed by: NURSE PRACTITIONER

## 2018-06-12 PROCEDURE — 1036F TOBACCO NON-USER: CPT | Performed by: NURSE PRACTITIONER

## 2018-06-12 PROCEDURE — 99214 OFFICE O/P EST MOD 30 MIN: CPT | Performed by: NURSE PRACTITIONER

## 2018-06-12 PROCEDURE — G8417 CALC BMI ABV UP PARAM F/U: HCPCS | Performed by: NURSE PRACTITIONER

## 2018-06-12 PROCEDURE — G8427 DOCREV CUR MEDS BY ELIG CLIN: HCPCS | Performed by: NURSE PRACTITIONER

## 2018-06-12 PROCEDURE — 4040F PNEUMOC VAC/ADMIN/RCVD: CPT | Performed by: NURSE PRACTITIONER

## 2018-06-15 RX ORDER — PRAVASTATIN SODIUM 40 MG
TABLET ORAL
Qty: 90 TABLET | Refills: 2 | OUTPATIENT
Start: 2018-06-15

## 2018-06-15 RX ORDER — CARVEDILOL 6.25 MG/1
TABLET ORAL
Qty: 180 TABLET | Refills: 3 | OUTPATIENT
Start: 2018-06-15

## 2018-06-18 RX ORDER — CARVEDILOL 6.25 MG/1
TABLET ORAL
Qty: 60 TABLET | Refills: 6 | Status: SHIPPED | OUTPATIENT
Start: 2018-06-18 | End: 2019-06-14 | Stop reason: SDUPTHER

## 2018-06-18 RX ORDER — FUROSEMIDE 20 MG/1
TABLET ORAL
Qty: 30 TABLET | Refills: 3 | Status: SHIPPED | OUTPATIENT
Start: 2018-06-18 | End: 2019-06-14 | Stop reason: SDUPTHER

## 2018-07-05 RX ORDER — PRAVASTATIN SODIUM 40 MG
TABLET ORAL
Qty: 90 TABLET | Refills: 2 | Status: SHIPPED | OUTPATIENT
Start: 2018-07-05 | End: 2019-06-14 | Stop reason: SDUPTHER

## 2018-07-25 RX ORDER — ISOSORBIDE MONONITRATE 60 MG/1
TABLET, EXTENDED RELEASE ORAL
Qty: 90 TABLET | Refills: 1 | Status: SHIPPED | OUTPATIENT
Start: 2018-07-25 | End: 2018-11-19 | Stop reason: SDUPTHER

## 2018-09-12 ENCOUNTER — TELEPHONE (OUTPATIENT)
Dept: CARDIOLOGY CLINIC | Age: 83
End: 2018-09-12

## 2018-09-12 NOTE — LETTER
[address] Cardiology - [place] [address] [address]aitlyn L[address] [address]  Phone: [phone]  Fax: [phone]    Raheel Coronel MD        2018    [address] [place] [address] Arun [place] [address]    1934  MRN # [id number]        [address]  Preoperative Risk Assessment    Max Perea  1934    Procedure Details  Procedure:  Left renal mass microwave ablation  Anesthesia:  MAC  Date:   [facility]    Patient Details  Antiplatelet(s):  ASA, Plavix   Indication: Prior coronary stent greater than 1 year  Anticoagulant:  NA   Indication: NA    Antiplatelets (AP) Periprocedurally  ~Do not stop/change antiplatelets unless absolutely necessary perioperatively. ~ASA 81mg should be continued perioperatively unless contraindicated. ~Dual antiplatelet for Bare Metal Stents should not be stopped for >30 days. ~Dual antiplatelet for Drug Eluting Stents should not be stopped for >365 days. ~Premature discontinuation of dual antiplatelet therapy increases CV risk. ~Elective procedures should be delayed in accordance with above timelines. ~Urgent/emergent procedures for which antiplatelets are contraindicated, interrupt as follows:   ~Plavix, Effient, Brilinta: 5 days prior to surgery  ~Resume dual antiplatelet ASAP and when safe from surgical perspective    Anticoagulants [facility]) Periprocedurally  ~Do not stop/change anticoagulants unless absolutely necessary perioperatively. ~If absolutely necessary, discontinuation of anticoagulants as follows:   ~Pradaxa, Eliquis, Xarelto: 2 days prior to procedure  ~Coumadin:   4 days prior to procedure, check INR preprocedure  ~Resume anticoagulation ASAP and when safe from surgical perspective    Recommendation(s)  CV Testing:  none  CV Risk:  low  AP Rec(See above):  May hold asa and Plavix perioperatively as necessary for procedure  [facility] Rec(See above): NA    ____________________ ___/___/___  Hay Bowling MD  Date [facility]  [phone]    If you have any questions or concerns, please don't hesitate to call.     Sincerely,        Beatrice Beck MD

## 2018-09-12 NOTE — TELEPHONE ENCOUNTER
Pt wife is calling. She is concerned about pt being taken off Plavix for 7 days due to surgery. She would like to speak to a nurse before the doctor signs off on this for surgery. Please call pt wife to advise.  Thank you

## 2018-10-11 NOTE — PROGRESS NOTES
[address]     Outpatient Follow Up Note    CHIEF COMPLAINT / HPI: Follow Up secondary to CAD, Aortic stenosis s/p 6/11 BIO AVR ,  HTN, Hyperlipidemia, post- surgery atrial fibrillation, and CVA     Danya Rosen is 80 y.o. male who presents today for a routine follow up  related to the above mentioned issues. Subjective:   Since the time of last office visit, the patient admits their symptoms have not changed   Patient's has a history of CAD, Aortic stenosis  s/p 6/11 BIO AVR ,  HTN, Hyperlipidemia, Post surgery- atrial fibrillation, and CVA, 10/4/18 s/p  left kidney ablation at [facility] due to cancer. At today's visit patient complains of dizziness. Today's EKG sinus rhythm. Negative for orthostatic hypotension. He denies any chest pain, palpitations, and edema. His SOB is stable. With regard to medication therapy the patient has been compliant with prescribed regimen. They have tolerated therapy to date. Past Medical History:   Diagnosis Date    CAD (coronary artery disease) calcifications    2004 MI    Cancer ([facility] [address]     hands and face, left ear squamous cell    Chronic diastolic heart failure (HCC)     Diabetes type 2, controlled ([facility] [address]     Hyperlipidemia     Hypertension     Obesity (BMI 30-39. 9)     Unspecified cerebral artery occlusion with cerebral infarction     CVA 4-2001 no residual     Social History:    History   Smoking Status    Never Smoker   Smokeless Tobacco    Never Used     Current Medications:  Current Outpatient Prescriptions   Medication Sig Dispense Refill    isosorbide mononitrate (IMDUR) 60 MG extended release tablet TAKE 1 TABLET BY MOUTH DAILY 90 tablet 1    pravastatin (PRAVACHOL) 40 MG tablet TAKE 1 TABLET BY MOUTH DAILY 90 tablet 2    furosemide (LASIX) 20 MG tablet TAKE 1 TABLET MONDAY, WED, AND FRIDAY 30 tablet 3    carvedilol (COREG) 6.25 MG tablet TAKE 1 TABLET BY MOUTH 2 TIMES DAILY (WITH MEALS) 60 tablet 6    Ciprofloxacin-Dexamethasone

## 2018-10-12 ENCOUNTER — OFFICE VISIT (OUTPATIENT)
Dept: CARDIOLOGY CLINIC | Age: 83
End: 2018-10-12
Payer: MEDICARE

## 2018-10-12 VITALS
HEIGHT: 69 IN | HEART RATE: 64 BPM | BODY MASS INDEX: 28.58 KG/M2 | DIASTOLIC BLOOD PRESSURE: 62 MMHG | WEIGHT: 193 LBS | SYSTOLIC BLOOD PRESSURE: 142 MMHG

## 2018-10-12 DIAGNOSIS — R42 DIZZINESS: ICD-10-CM

## 2018-10-12 DIAGNOSIS — I25.10 CORONARY ARTERY DISEASE INVOLVING NATIVE CORONARY ARTERY OF NATIVE HEART WITHOUT ANGINA PECTORIS: ICD-10-CM

## 2018-10-12 DIAGNOSIS — R42 DIZZY: ICD-10-CM

## 2018-10-12 DIAGNOSIS — I10 ESSENTIAL HYPERTENSION: ICD-10-CM

## 2018-10-12 DIAGNOSIS — I48.0 PAROXYSMAL ATRIAL FIBRILLATION (HCC): ICD-10-CM

## 2018-10-12 DIAGNOSIS — E78.2 MIXED HYPERLIPIDEMIA: ICD-10-CM

## 2018-10-12 DIAGNOSIS — I35.0 NONRHEUMATIC AORTIC VALVE STENOSIS: Primary | ICD-10-CM

## 2018-10-12 PROCEDURE — 1101F PT FALLS ASSESS-DOCD LE1/YR: CPT | Performed by: NURSE PRACTITIONER

## 2018-10-12 PROCEDURE — G8417 CALC BMI ABV UP PARAM F/U: HCPCS | Performed by: NURSE PRACTITIONER

## 2018-10-12 PROCEDURE — G8598 ASA/ANTIPLAT THER USED: HCPCS | Performed by: NURSE PRACTITIONER

## 2018-10-12 PROCEDURE — 1036F TOBACCO NON-USER: CPT | Performed by: NURSE PRACTITIONER

## 2018-10-12 PROCEDURE — G8484 FLU IMMUNIZE NO ADMIN: HCPCS | Performed by: NURSE PRACTITIONER

## 2018-10-12 PROCEDURE — G8427 DOCREV CUR MEDS BY ELIG CLIN: HCPCS | Performed by: NURSE PRACTITIONER

## 2018-10-12 PROCEDURE — 4040F PNEUMOC VAC/ADMIN/RCVD: CPT | Performed by: NURSE PRACTITIONER

## 2018-10-12 PROCEDURE — 99214 OFFICE O/P EST MOD 30 MIN: CPT | Performed by: NURSE PRACTITIONER

## 2018-10-12 PROCEDURE — 1123F ACP DISCUSS/DSCN MKR DOCD: CPT | Performed by: NURSE PRACTITIONER

## 2018-10-12 PROCEDURE — 93000 ELECTROCARDIOGRAM COMPLETE: CPT | Performed by: NURSE PRACTITIONER

## 2018-10-15 NOTE — COMMUNICATION BODY
[address]     Outpatient Follow Up Note    CHIEF COMPLAINT / HPI: Follow Up secondary to CAD, Aortic stenosis s/p 6/11 BIO AVR ,  HTN, Hyperlipidemia, post- surgery atrial fibrillation, and CVA     Kerri Vásquez is 80 y.o. male who presents today for a routine follow up  related to the above mentioned issues. Subjective:   Since the time of last office visit, the patient admits their symptoms have not changed   Patient's has a history of CAD, Aortic stenosis  s/p 6/11 BIO AVR ,  HTN, Hyperlipidemia, Post surgery- atrial fibrillation, and CVA, 10/4/18 s/p  left kidney ablation at [facility] due to cancer. At today's visit patient complains of dizziness. Today's EKG sinus rhythm. Negative for orthostatic hypotension. He denies any chest pain, palpitations, and edema. His SOB is stable. With regard to medication therapy the patient has been compliant with prescribed regimen. They have tolerated therapy to date. Past Medical History:   Diagnosis Date    CAD (coronary artery disease) calcifications    2004 MI    Cancer ([facility] [address]     hands and face, left ear squamous cell    Chronic diastolic heart failure (HCC)     Diabetes type 2, controlled ([facility] [address]     Hyperlipidemia     Hypertension     Obesity (BMI 30-39. 9)     Unspecified cerebral artery occlusion with cerebral infarction     CVA 4-2001 no residual     Social History:    History   Smoking Status    Never Smoker   Smokeless Tobacco    Never Used     Current Medications:  Current Outpatient Prescriptions   Medication Sig Dispense Refill    isosorbide mononitrate (IMDUR) 60 MG extended release tablet TAKE 1 TABLET BY MOUTH DAILY 90 tablet 1    pravastatin (PRAVACHOL) 40 MG tablet TAKE 1 TABLET BY MOUTH DAILY 90 tablet 2    furosemide (LASIX) 20 MG tablet TAKE 1 TABLET MONDAY, WED, AND FRIDAY 30 tablet 3    carvedilol (COREG) 6.25 MG tablet TAKE 1 TABLET BY MOUTH 2 TIMES DAILY (WITH MEALS) 60 tablet 6    Ciprofloxacin-Dexamethasone (CIPRODEX OT) Place 7.5 mLs in ear(s) Indications: taking 1 week      nitroGLYCERIN (NITROSTAT) 0.4 MG SL tablet Place 1 tablet under the tongue every 5 minutes as needed for Chest pain 25 tablet 3    DiphenhydrAMINE HCl (BENADRYL PO) Take by mouth nightly as needed       Multiple Vitamins-Minerals (PRESERVISION AREDS 2) CAPS Take  by mouth 2 times daily.  albuterol (PROAIR HFA) 108 (90 BASE) MCG/ACT inhaler Inhale 2 puffs into the lungs every 6 hours as needed for Wheezing.  loratadine (CLARITIN) 10 MG tablet Take 10 mg by mouth daily.  vitamin B-12 (CYANOCOBALAMIN) 1000 MCG tablet Take 1,000 mcg by mouth daily.  clopidogrel (PLAVIX) 75 MG tablet Take 75 mg by mouth daily.  insulin aspart protamine-insulin aspart (NOVOLOG 70/30) (70-30) 100 UNIT/ML injection Inject 20 Units into the skin 2 times daily (with meals).  acetaminophen (TYLENOL) 500 MG tablet Take 500 mg by mouth 2 tabs bid      aspirin 81 MG EC tablet Take 81 mg by mouth daily.  glipiZIDE (GLUCOTROL) 5 MG tablet Take 1 tablet by mouth 2 times daily (before meals). 60 tablet 6     No current facility-administered medications for this visit. REVIEW OF SYSTEMS:   CONSTITUTIONAL: No major weight gain or loss, fatigue, weakness, night sweats or fever. There's been no change in energy level, sleep pattern, or activity level. HEENT: No new vision difficulties or ringing in the ears. + dizziness   RESPIRATORY: No new SOB, PND, orthopnea or cough. CARDIOVASCULAR: See HPI  GI: No nausea, vomiting, diarrhea, constipation, abdominal pain or changes in bowel habits. : No urinary frequency, urgency, incontinence hematuria or dysuria. SKIN: No cyanosis or skin lesions. MUSCULOSKELETAL: No new muscle or joint pain. NEUROLOGICAL: No syncope or TIA-like symptoms.   PSYCHIATRIC: No anxiety, pain, insomnia or depression    Objective:   PHYSICAL EXAM:        VITALS:    Vitals:    10/12/18 1106   BP: (!) 142/62  The left mid, distal common carotid artery demonstrates moderate focal    plaque formation. Last ECHO: 8/2016     -Global ejection fraction is low normal and estimated from 50 % to 55 %.  -No definitive regional wall motion abnormalities could be identified.   -Moderately dilated left atrium.   -The aortic valve bioprosthesis appears to be well seated. The aortic valve   mean PG is estimated at 12 mmHg. The AV peak PG is estimated at 22 mmHg. The   aortic valve area is estimated at 1.04 cm^2. Trace aortic insufficiency.   -Thickened mitral valve without evidence of stenosis. Mild mitral annular   calcification.  Trace mitral regurgitation.   -There is reversal of E/A inflow velocities across the mitral valve   suggesting impaired left ventricular relaxation.   -E/e'= 19.5 .  10/12/18 EKG sinus rhythm reviewed by me     Assessment:       ~CAD   Date Result   Sx  Denies any chest pain   Hx 6/11 CABG   [facility] 2/12 2/16 SVG grafts occluded, LIMA patent  LIMA to LAD patent, medical management   Cardionet 5/12 normal   TTE      MPI 2/12  10/14  8/16  8/9/16 45%  55%, mild MR  50-55% trace AI  EF 40% small sized anterospetal fixed defect c/w infarction in the territory of the prox LAD, moderat large sized inferolateral completely reversible defect c/w inschemia   Meds  Reviewed   Plan  Continue aggressive medical therapy and risk factor modification  Continue current doses coreg, imdur, pravastatin, lasix,  nitro       ~ Aortic Stenosis     6/11 Bio AVR     ECHO: 2/16: 55%, MG 15, 8/16: EF 55%, MG 12     Continue same regimen     ~ Hypertension     Today's B/P- 142/62- stable     Continue current medical regimen: Coreg 6.25 mg BID, and Imdur 60 mg daily    ~ Hyperlipidemia      2/6/16: HDL: 35, LDL: 68     On Pravastatin 40 mg daily     Followed per PCP     ~ Atrial Fibrillation     Postop open heart surgery, resolved    No recurrence     ~ Dizziness      Negative for orthostatics       Plan: order an event monitor

## 2018-10-17 ENCOUNTER — TELEPHONE (OUTPATIENT)
Dept: CARDIOLOGY CLINIC | Age: 83
End: 2018-10-17

## 2018-10-17 NOTE — TELEPHONE ENCOUNTER
Spoke to pt's wife, she states [facility] did end up calling them back to discuss the issue. Per pt's wife the issue has been resolved.

## 2018-10-29 ENCOUNTER — HOSPITAL ENCOUNTER (OUTPATIENT)
Dept: NON INVASIVE DIAGNOSTICS | Age: 83
Discharge: HOME | End: 2018-10-29
Payer: MEDICARE

## 2018-10-29 LAB
LEFT VENTRICULAR EJECTION FRACTION MODE: NORMAL
LV EF: 40 %
LV EF: 40 %
LVEF MODALITY: NORMAL

## 2018-10-29 PROCEDURE — 93306 TTE W/DOPPLER COMPLETE: CPT

## 2018-11-13 PROCEDURE — 93228 REMOTE 30 DAY ECG REV/REPORT: CPT | Performed by: INTERNAL MEDICINE

## 2018-11-14 DIAGNOSIS — I35.0 NONRHEUMATIC AORTIC VALVE STENOSIS: ICD-10-CM

## 2018-11-14 DIAGNOSIS — R42 DIZZINESS: ICD-10-CM

## 2018-11-19 RX ORDER — ISOSORBIDE MONONITRATE 60 MG/1
TABLET, EXTENDED RELEASE ORAL
Qty: 90 TABLET | Refills: 1 | Status: SHIPPED | OUTPATIENT
Start: 2018-11-19 | End: 2019-06-14 | Stop reason: SDUPTHER

## 2019-02-12 ENCOUNTER — OFFICE VISIT (OUTPATIENT)
Dept: CARDIOLOGY CLINIC | Age: 84
End: 2019-02-12
Payer: MEDICARE

## 2019-02-12 VITALS
BODY MASS INDEX: 30.9 KG/M2 | HEIGHT: 68 IN | OXYGEN SATURATION: 97 % | SYSTOLIC BLOOD PRESSURE: 130 MMHG | WEIGHT: 203.9 LBS | DIASTOLIC BLOOD PRESSURE: 74 MMHG | HEART RATE: 70 BPM

## 2019-02-12 DIAGNOSIS — E78.2 MIXED HYPERLIPIDEMIA: ICD-10-CM

## 2019-02-12 DIAGNOSIS — I10 ESSENTIAL HYPERTENSION: ICD-10-CM

## 2019-02-12 DIAGNOSIS — I25.10 CORONARY ARTERY DISEASE INVOLVING NATIVE CORONARY ARTERY OF NATIVE HEART WITHOUT ANGINA PECTORIS: Primary | ICD-10-CM

## 2019-02-12 DIAGNOSIS — I48.0 PAROXYSMAL ATRIAL FIBRILLATION (HCC): ICD-10-CM

## 2019-02-12 DIAGNOSIS — I35.0 NONRHEUMATIC AORTIC VALVE STENOSIS: ICD-10-CM

## 2019-02-12 PROCEDURE — 99214 OFFICE O/P EST MOD 30 MIN: CPT | Performed by: NURSE PRACTITIONER

## 2019-02-12 PROCEDURE — 4040F PNEUMOC VAC/ADMIN/RCVD: CPT | Performed by: NURSE PRACTITIONER

## 2019-02-12 PROCEDURE — 1101F PT FALLS ASSESS-DOCD LE1/YR: CPT | Performed by: NURSE PRACTITIONER

## 2019-02-12 PROCEDURE — G8427 DOCREV CUR MEDS BY ELIG CLIN: HCPCS | Performed by: NURSE PRACTITIONER

## 2019-02-12 PROCEDURE — G8417 CALC BMI ABV UP PARAM F/U: HCPCS | Performed by: NURSE PRACTITIONER

## 2019-02-12 PROCEDURE — 1123F ACP DISCUSS/DSCN MKR DOCD: CPT | Performed by: NURSE PRACTITIONER

## 2019-02-12 PROCEDURE — 1036F TOBACCO NON-USER: CPT | Performed by: NURSE PRACTITIONER

## 2019-02-12 PROCEDURE — G8484 FLU IMMUNIZE NO ADMIN: HCPCS | Performed by: NURSE PRACTITIONER

## 2019-02-12 PROCEDURE — G8598 ASA/ANTIPLAT THER USED: HCPCS | Performed by: NURSE PRACTITIONER

## 2019-06-14 NOTE — TELEPHONE ENCOUNTER
Medication Refill    When was your last appointment with cardiology?      (if  it's been more than 1 year, please go ahead and schedule an appointment with the physician while you have the patient on the phone)      Medication needing refilled:  isosorbide 60mg , furosemide 20mg , carvedilol 6.25mg , pravastatin 40mg     Doseage of the medication:    How are you taking this medication (QD, BID, TID, QID, PRN):    Patient want a 30 or 90 day supply called in:    Which Pharmacy are we sending the medication to [place] [id number] APPT 8/13/19 w npts    Medication Question/Concern    (if  it's been more than 1 year, please go ahead and schedule an appointment with the physician while you have the patient on the phone)    What is the name of the medication you need to speak with someone about? Doseage of the medication:    How are you taking this medication:    What issues/concerns are you having with this medication:      Medication Samples    (if  it's been more than 1 year, please go ahead and schedule an appointment with the physician while you have the patient on the phone)    Medication:    Doseage of the medication:    How are you taking this medication (QD, BID, TID, QID, PRN):     in the office or Mail to your home?

## 2019-06-18 RX ORDER — ISOSORBIDE MONONITRATE 60 MG/1
TABLET, EXTENDED RELEASE ORAL
Qty: 90 TABLET | Refills: 0 | Status: SHIPPED | OUTPATIENT
Start: 2019-06-18 | End: 2019-08-21 | Stop reason: SDUPTHER

## 2019-06-18 RX ORDER — CARVEDILOL 6.25 MG/1
TABLET ORAL
Qty: 180 TABLET | Refills: 0 | Status: SHIPPED | OUTPATIENT
Start: 2019-06-18 | End: 2019-08-21 | Stop reason: SDUPTHER

## 2019-06-18 RX ORDER — FUROSEMIDE 20 MG/1
TABLET ORAL
Qty: 90 TABLET | Refills: 0 | Status: SHIPPED | OUTPATIENT
Start: 2019-06-18 | End: 2019-08-21 | Stop reason: SDUPTHER

## 2019-06-18 RX ORDER — PRAVASTATIN SODIUM 40 MG
TABLET ORAL
Qty: 90 TABLET | Refills: 0 | Status: SHIPPED | OUTPATIENT
Start: 2019-06-18 | End: 2019-08-21 | Stop reason: SDUPTHER

## 2019-08-13 ENCOUNTER — OFFICE VISIT (OUTPATIENT)
Dept: CARDIOLOGY CLINIC | Age: 84
End: 2019-08-13
Payer: MEDICARE

## 2019-08-13 VITALS
OXYGEN SATURATION: 98 % | BODY MASS INDEX: 30.36 KG/M2 | HEART RATE: 68 BPM | WEIGHT: 205 LBS | SYSTOLIC BLOOD PRESSURE: 142 MMHG | DIASTOLIC BLOOD PRESSURE: 78 MMHG | HEIGHT: 69 IN

## 2019-08-13 DIAGNOSIS — I10 ESSENTIAL HYPERTENSION: ICD-10-CM

## 2019-08-13 DIAGNOSIS — E78.2 MIXED HYPERLIPIDEMIA: ICD-10-CM

## 2019-08-13 DIAGNOSIS — I35.0 NONRHEUMATIC AORTIC VALVE STENOSIS: ICD-10-CM

## 2019-08-13 DIAGNOSIS — I25.10 CORONARY ARTERY DISEASE INVOLVING NATIVE CORONARY ARTERY OF NATIVE HEART WITHOUT ANGINA PECTORIS: Primary | ICD-10-CM

## 2019-08-13 PROCEDURE — G8427 DOCREV CUR MEDS BY ELIG CLIN: HCPCS | Performed by: NURSE PRACTITIONER

## 2019-08-13 PROCEDURE — 4040F PNEUMOC VAC/ADMIN/RCVD: CPT | Performed by: NURSE PRACTITIONER

## 2019-08-13 PROCEDURE — G8417 CALC BMI ABV UP PARAM F/U: HCPCS | Performed by: NURSE PRACTITIONER

## 2019-08-13 PROCEDURE — 99214 OFFICE O/P EST MOD 30 MIN: CPT | Performed by: NURSE PRACTITIONER

## 2019-08-13 PROCEDURE — G8598 ASA/ANTIPLAT THER USED: HCPCS | Performed by: NURSE PRACTITIONER

## 2019-08-13 PROCEDURE — 1036F TOBACCO NON-USER: CPT | Performed by: NURSE PRACTITIONER

## 2019-08-13 PROCEDURE — 1123F ACP DISCUSS/DSCN MKR DOCD: CPT | Performed by: NURSE PRACTITIONER

## 2019-08-13 RX ORDER — NITROGLYCERIN 0.4 MG/1
0.4 TABLET SUBLINGUAL EVERY 5 MIN PRN
Qty: 25 TABLET | Refills: 3 | Status: SHIPPED | OUTPATIENT
Start: 2019-08-13 | End: 2020-01-01

## 2019-08-13 NOTE — COMMUNICATION BODY
needed for Chest pain 25 tablet 3    DiphenhydrAMINE HCl (BENADRYL PO) Take by mouth nightly       Multiple Vitamins-Minerals (PRESERVISION AREDS 2) CAPS Take  by mouth 2 times daily.  loratadine (CLARITIN) 10 MG tablet Take 10 mg by mouth daily.  vitamin B-12 (CYANOCOBALAMIN) 1000 MCG tablet Take 1,000 mcg by mouth daily.  clopidogrel (PLAVIX) 75 MG tablet Take 75 mg by mouth daily.  insulin aspart protamine-insulin aspart (NOVOLOG 70/30) (70-30) 100 UNIT/ML injection Inject 16 Units into the skin       acetaminophen (TYLENOL) 500 MG tablet Take 500 mg by mouth 2 times daily       aspirin 81 MG EC tablet Take 81 mg by mouth daily.  glipiZIDE (GLUCOTROL) 5 MG tablet Take 1 tablet by mouth 2 times daily (before meals). 60 tablet 6       Objective:   PHYSICAL EXAM:    Vitals:    08/13/19 1322 08/13/19 1348   BP: 132/72 (!) 142/78   Site: Left Upper Arm    Position: Sitting    Cuff Size: Medium Adult    Pulse: 68    SpO2: 98%    Weight: 205 lb (93 kg)    Height: 5' 8.5\" (1.74 m)          VITALS:  /72 (Site: Left Upper Arm, Position: Sitting, Cuff Size: Medium Adult)   Pulse 68   Ht 5' 8.5\" (1.74 m)   Wt 205 lb (93 kg)   SpO2 98%   BMI 30.72 kg/m²    CONSTITUTIONAL: Cooperative, no apparent distress, and appears well nourished / developed  NEUROLOGIC:  Awake and orientated to person, place and time. PSYCH: Calm affect; very pleasant man. SKIN: Warm and dry. HEENT: Sclera non-icteric, normocephalic, neck supple, no elevation of JVP, normal carotid pulses with no bruits and thyroid normal size. LUNGS:  No increased work of breathing and clear to auscultation, no crackles or wheezing  CARDIOVASCULAR:  Regular rate 76 and rhythm with + murmur 2nd ICS Rt MCL, gallops, rubs, or abnormal heart sounds, normal PMI. The apical impulses not displaced  JVP less than 8 cm H2O  Heart tones are crisp and normal  Cervical veins are not engorged  The carotid upstroke is normal in hypertension   ~controlled / reasonable     4. Mixed hyperlipidemia   ~trig and HDL near goal  ~tolerating pravastatin       I had the opportunity to review the clinical symptoms and presentation of Reginold Kayleigh. Plan:     1. Continue present management  2. F/U in 8 months with Dr. Sana Ivory     Overall the patient is stable from CV standpoint    Further evaluation will be based upon the patient's clinical course     Thank you for allowing to us to participate in the care of Reginold Kayleigh.     Jannette Alcantara APRN

## 2019-08-13 NOTE — LETTER
JVP, normal carotid pulses with no bruits and thyroid normal size. LUNGS:  No increased work of breathing and clear to auscultation, no crackles or wheezing  CARDIOVASCULAR:  Regular rate 76 and rhythm with + murmur 2nd ICS Rt MCL, gallops, rubs, or abnormal heart sounds, normal PMI. The apical impulses not displaced  JVP less than 8 cm H2O  Heart tones are crisp and normal  Cervical veins are not engorged  The carotid upstroke is normal in amplitude and contour without delay or bruit  JVP is not elevated  ABDOMEN:  Normal bowel sounds, non-distended and non-tender to palpation  EXT: No edema, no calf tenderness. Pulses are present bilaterally. DATA:      LABS: 6/5/19:   Na+ 143 K+ 4.4 chl 104 CO2 27 BUN 18 creatinine 1.01 glu 165 GFR > 60   A1c 6.8%     10/26/18:    trig 166 HDL 35 LDL 99    Radiology Review:  Pertinent images / reports were reviewed as a part of this visit and reveals the following:    Last Echo: Oct '18:  Summary   -Left ventricular size is normal .   -Mild concentric left ventricular hypertrophy is present.   -Global ejection fraction is mild-moderately reduced and estimated around 40%.  -No definitive regional wall motion abnormalities could be identified.   -Grade I diastolic dysfunction with normal LV filling pressures.   -E/e'= 17.   -Mild mitral annular calcification.   -Mild mitral regurgitation.   -Moderately dilated left atrium.   -Mild tricuspid regurgitation with a RVSP of 43 mmHg,  Aortic Valve   The aortic valve bioprosthesis appears to be well seated. The aortic valve   mean PG is estimated a 17 mmHg. The AV peak PG is estimated at 28 mmHg.   Trace aortic insufficiency.     Last Stress Test: Aug '16  Summary    Small sized anteroseptal fixed defect consistent with infarction in the    territory of the proximal LAD .    Moderate-large sized inferolateral completely reversible defect consistent    with ischemia in the territory of the mid and distal LCx .

## 2019-08-21 RX ORDER — CARVEDILOL 6.25 MG/1
TABLET ORAL
Qty: 180 TABLET | Refills: 0 | Status: SHIPPED | OUTPATIENT
Start: 2019-08-21 | End: 2020-01-01

## 2019-08-21 RX ORDER — FUROSEMIDE 20 MG/1
TABLET ORAL
Qty: 90 TABLET | Refills: 0 | Status: SHIPPED | OUTPATIENT
Start: 2019-08-21 | End: 2020-01-01 | Stop reason: SDUPTHER

## 2019-08-21 RX ORDER — PRAVASTATIN SODIUM 40 MG
TABLET ORAL
Qty: 90 TABLET | Refills: 0 | Status: SHIPPED | OUTPATIENT
Start: 2019-08-21 | End: 2020-01-01

## 2019-08-21 RX ORDER — ISOSORBIDE MONONITRATE 60 MG/1
TABLET, EXTENDED RELEASE ORAL
Qty: 90 TABLET | Refills: 0 | Status: SHIPPED | OUTPATIENT
Start: 2019-08-21 | End: 2020-01-01

## 2020-01-01 ENCOUNTER — TELEPHONE (OUTPATIENT)
Dept: CARDIOLOGY CLINIC | Age: 85
End: 2020-01-01

## 2020-01-01 ENCOUNTER — APPOINTMENT (OUTPATIENT)
Dept: GENERAL RADIOLOGY | Age: 85
DRG: 281 | End: 2020-01-01
Attending: HOSPITALIST
Payer: MEDICARE

## 2020-01-01 ENCOUNTER — HOSPITAL ENCOUNTER (INPATIENT)
Age: 85
LOS: 3 days | Discharge: HOSPICE | DRG: 281 | End: 2021-01-01
Attending: HOSPITALIST | Admitting: HOSPITALIST
Payer: MEDICARE

## 2020-01-01 ENCOUNTER — NURSE ONLY (OUTPATIENT)
Dept: CARDIOLOGY CLINIC | Age: 85
End: 2020-01-01
Payer: MEDICARE

## 2020-01-01 ENCOUNTER — APPOINTMENT (OUTPATIENT)
Dept: CT IMAGING | Age: 85
DRG: 281 | End: 2020-01-01
Attending: HOSPITALIST
Payer: MEDICARE

## 2020-01-01 ENCOUNTER — VIRTUAL VISIT (OUTPATIENT)
Dept: CARDIOLOGY CLINIC | Age: 85
End: 2020-01-01
Payer: MEDICARE

## 2020-01-01 ENCOUNTER — OFFICE VISIT (OUTPATIENT)
Dept: CARDIOLOGY CLINIC | Age: 85
End: 2020-01-01
Payer: MEDICARE

## 2020-01-01 ENCOUNTER — HOSPITAL ENCOUNTER (OUTPATIENT)
Dept: NON INVASIVE DIAGNOSTICS | Age: 85
Discharge: HOME | End: 2020-08-13
Payer: MEDICARE

## 2020-01-01 VITALS
BODY MASS INDEX: 30.31 KG/M2 | HEART RATE: 82 BPM | WEIGHT: 200 LBS | DIASTOLIC BLOOD PRESSURE: 70 MMHG | HEIGHT: 68 IN | OXYGEN SATURATION: 97 % | SYSTOLIC BLOOD PRESSURE: 154 MMHG

## 2020-01-01 VITALS
HEART RATE: 69 BPM | DIASTOLIC BLOOD PRESSURE: 84 MMHG | SYSTOLIC BLOOD PRESSURE: 132 MMHG | WEIGHT: 200 LBS | HEIGHT: 68 IN | BODY MASS INDEX: 30.31 KG/M2 | RESPIRATION RATE: 18 BRPM | OXYGEN SATURATION: 98 %

## 2020-01-01 VITALS
SYSTOLIC BLOOD PRESSURE: 140 MMHG | HEIGHT: 69 IN | DIASTOLIC BLOOD PRESSURE: 80 MMHG | WEIGHT: 202.6 LBS | HEART RATE: 68 BPM | BODY MASS INDEX: 30.01 KG/M2 | OXYGEN SATURATION: 97 % | TEMPERATURE: 98.7 F

## 2020-01-01 DIAGNOSIS — Z51.5 HOSPICE CARE: Primary | ICD-10-CM

## 2020-01-01 LAB
ANION GAP SERPL CALCULATED.3IONS-SCNC: 10 MMOL/L (ref 3–16)
ANION GAP SERPL CALCULATED.3IONS-SCNC: 11 MMOL/L (ref 3–16)
ANION GAP SERPL CALCULATED.3IONS-SCNC: 16 MMOL/L (ref 3–16)
APTT: 42.8 SEC (ref 24.2–36.2)
APTT: 44.5 SEC (ref 24.2–36.2)
APTT: 49.7 SEC (ref 24.2–36.2)
APTT: 56.3 SEC (ref 24.2–36.2)
APTT: 56.9 SEC (ref 24.2–36.2)
APTT: 57 SEC (ref 24.2–36.2)
APTT: 79.1 SEC (ref 24.2–36.2)
BASE EXCESS VENOUS: -4.1 MMOL/L (ref -3–3)
BASOPHILS ABSOLUTE: 0.2 K/UL (ref 0–0.2)
BASOPHILS ABSOLUTE: 0.3 K/UL (ref 0–0.2)
BASOPHILS RELATIVE PERCENT: 2.4 %
BASOPHILS RELATIVE PERCENT: 4.1 %
BILIRUBIN URINE: NEGATIVE
BLOOD, URINE: NEGATIVE
BUN BLDV-MCNC: 31 MG/DL (ref 7–20)
BUN BLDV-MCNC: 42 MG/DL (ref 7–20)
BUN BLDV-MCNC: 51 MG/DL (ref 7–20)
C-REACTIVE PROTEIN: 159.7 MG/L (ref 0–5.1)
CALCIUM SERPL-MCNC: 8.5 MG/DL (ref 8.3–10.6)
CALCIUM SERPL-MCNC: 8.9 MG/DL (ref 8.3–10.6)
CALCIUM SERPL-MCNC: 9 MG/DL (ref 8.3–10.6)
CARBOXYHEMOGLOBIN: 3.2 % (ref 0–1.5)
CHLORIDE BLD-SCNC: 101 MMOL/L (ref 99–110)
CHLORIDE BLD-SCNC: 103 MMOL/L (ref 99–110)
CHLORIDE BLD-SCNC: 95 MMOL/L (ref 99–110)
CLARITY: CLEAR
CO2: 21 MMOL/L (ref 21–32)
CO2: 24 MMOL/L (ref 21–32)
CO2: 25 MMOL/L (ref 21–32)
COLOR: YELLOW
CREAT SERPL-MCNC: 0.8 MG/DL (ref 0.8–1.3)
CREAT SERPL-MCNC: 0.9 MG/DL (ref 0.8–1.3)
CREAT SERPL-MCNC: 1.2 MG/DL (ref 0.8–1.3)
EOSINOPHILS ABSOLUTE: 0.1 K/UL (ref 0–0.6)
EOSINOPHILS ABSOLUTE: 0.1 K/UL (ref 0–0.6)
EOSINOPHILS RELATIVE PERCENT: 0.8 %
EOSINOPHILS RELATIVE PERCENT: 1.6 %
EPITHELIAL CELLS, UA: 1 /HPF (ref 0–5)
GFR AFRICAN AMERICAN: >60
GFR NON-AFRICAN AMERICAN: 57
GFR NON-AFRICAN AMERICAN: >60
GFR NON-AFRICAN AMERICAN: >60
GLUCOSE BLD-MCNC: 113 MG/DL (ref 70–99)
GLUCOSE BLD-MCNC: 116 MG/DL (ref 70–99)
GLUCOSE BLD-MCNC: 134 MG/DL (ref 70–99)
GLUCOSE BLD-MCNC: 146 MG/DL (ref 70–99)
GLUCOSE BLD-MCNC: 196 MG/DL (ref 70–99)
GLUCOSE BLD-MCNC: 200 MG/DL (ref 70–99)
GLUCOSE BLD-MCNC: 212 MG/DL (ref 70–99)
GLUCOSE BLD-MCNC: 228 MG/DL (ref 70–99)
GLUCOSE BLD-MCNC: 229 MG/DL (ref 70–99)
GLUCOSE BLD-MCNC: 244 MG/DL (ref 70–99)
GLUCOSE BLD-MCNC: 255 MG/DL (ref 70–99)
GLUCOSE BLD-MCNC: 412 MG/DL (ref 70–99)
GLUCOSE BLD-MCNC: 467 MG/DL (ref 70–99)
GLUCOSE BLD-MCNC: 522 MG/DL (ref 70–99)
GLUCOSE URINE: >=1000 MG/DL
HCO3 VENOUS: 20.5 MMOL/L (ref 23–29)
HCT VFR BLD CALC: 26.1 % (ref 40.5–52.5)
HCT VFR BLD CALC: 30.3 % (ref 40.5–52.5)
HCT VFR BLD CALC: 33.4 % (ref 40.5–52.5)
HEMOGLOBIN, VEN, REDUCED: 41 %
HEMOGLOBIN: 10.7 G/DL (ref 13.5–17.5)
HEMOGLOBIN: 8.4 G/DL (ref 13.5–17.5)
HEMOGLOBIN: 9.8 G/DL (ref 13.5–17.5)
HYALINE CASTS: 6 /LPF (ref 0–8)
KETONES, URINE: NEGATIVE MG/DL
LEUKOCYTE ESTERASE, URINE: NEGATIVE
LV EF: 35 %
LVEF MODALITY: NORMAL
LYMPHOCYTES ABSOLUTE: 1.1 K/UL (ref 1–5.1)
LYMPHOCYTES ABSOLUTE: 1.4 K/UL (ref 1–5.1)
LYMPHOCYTES RELATIVE PERCENT: 15.4 %
LYMPHOCYTES RELATIVE PERCENT: 15.6 %
MCH RBC QN AUTO: 28.8 PG (ref 26–34)
MCH RBC QN AUTO: 29 PG (ref 26–34)
MCH RBC QN AUTO: 29.4 PG (ref 26–34)
MCHC RBC AUTO-ENTMCNC: 32.1 G/DL (ref 31–36)
MCHC RBC AUTO-ENTMCNC: 32.2 G/DL (ref 31–36)
MCHC RBC AUTO-ENTMCNC: 32.4 G/DL (ref 31–36)
MCV RBC AUTO: 89.3 FL (ref 80–100)
MCV RBC AUTO: 89.4 FL (ref 80–100)
MCV RBC AUTO: 91.6 FL (ref 80–100)
METHEMOGLOBIN VENOUS: 0.1 %
MICROSCOPIC EXAMINATION: YES
MONOCYTES ABSOLUTE: 0.8 K/UL (ref 0–1.3)
MONOCYTES ABSOLUTE: 1.1 K/UL (ref 0–1.3)
MONOCYTES RELATIVE PERCENT: 11.8 %
MONOCYTES RELATIVE PERCENT: 12 %
NEUTROPHILS ABSOLUTE: 4.7 K/UL (ref 1.7–7.7)
NEUTROPHILS ABSOLUTE: 6.2 K/UL (ref 1.7–7.7)
NEUTROPHILS RELATIVE PERCENT: 67.1 %
NEUTROPHILS RELATIVE PERCENT: 69.2 %
NITRITE, URINE: NEGATIVE
O2 CONTENT, VEN: 9 VOL %
O2 SAT, VEN: 58 %
O2 THERAPY: ABNORMAL
PCO2, VEN: 34.6 MMHG (ref 40–50)
PDW BLD-RTO: 17.9 % (ref 12.4–15.4)
PDW BLD-RTO: 18.1 % (ref 12.4–15.4)
PDW BLD-RTO: 18.2 % (ref 12.4–15.4)
PERFORMED ON: ABNORMAL
PH UA: 5 (ref 5–8)
PH VENOUS: 7.38 (ref 7.35–7.45)
PLATELET # BLD: 410 K/UL (ref 135–450)
PLATELET # BLD: 416 K/UL (ref 135–450)
PLATELET # BLD: 440 K/UL (ref 135–450)
PMV BLD AUTO: 7.4 FL (ref 5–10.5)
PMV BLD AUTO: 7.5 FL (ref 5–10.5)
PMV BLD AUTO: 7.5 FL (ref 5–10.5)
PO2, VEN: 31.5 MMHG (ref 25–40)
POTASSIUM SERPL-SCNC: 3.8 MMOL/L (ref 3.5–5.1)
POTASSIUM SERPL-SCNC: 4.8 MMOL/L (ref 3.5–5.1)
POTASSIUM SERPL-SCNC: 5.4 MMOL/L (ref 3.5–5.1)
PRO-BNP: ABNORMAL PG/ML (ref 0–449)
PROTEIN UA: ABNORMAL MG/DL
RBC # BLD: 2.91 M/UL (ref 4.2–5.9)
RBC # BLD: 3.4 M/UL (ref 4.2–5.9)
RBC # BLD: 3.65 M/UL (ref 4.2–5.9)
RBC UA: 4 /HPF (ref 0–4)
SEDIMENTATION RATE, ERYTHROCYTE: 83 MM/HR (ref 0–20)
SODIUM BLD-SCNC: 132 MMOL/L (ref 136–145)
SODIUM BLD-SCNC: 137 MMOL/L (ref 136–145)
SODIUM BLD-SCNC: 137 MMOL/L (ref 136–145)
SPECIFIC GRAVITY UA: 1.03 (ref 1–1.03)
TCO2 CALC VENOUS: 48 MMOL/L
TROPONIN: 0.1 NG/ML
TROPONIN: 0.11 NG/ML
TROPONIN: 0.12 NG/ML
TROPONIN: 0.13 NG/ML
TROPONIN: 0.14 NG/ML
URINE TYPE: ABNORMAL
UROBILINOGEN, URINE: 1 E.U./DL
WBC # BLD: 7 K/UL (ref 4–11)
WBC # BLD: 8.4 K/UL (ref 4–11)
WBC # BLD: 9 K/UL (ref 4–11)
WBC UA: 12 /HPF (ref 0–5)

## 2020-01-01 PROCEDURE — 2700000000 HC OXYGEN THERAPY PER DAY

## 2020-01-01 PROCEDURE — 85025 COMPLETE CBC W/AUTO DIFF WBC: CPT

## 2020-01-01 PROCEDURE — 93228 REMOTE 30 DAY ECG REV/REPORT: CPT | Performed by: INTERNAL MEDICINE

## 2020-01-01 PROCEDURE — 6370000000 HC RX 637 (ALT 250 FOR IP): Performed by: INTERNAL MEDICINE

## 2020-01-01 PROCEDURE — 6370000000 HC RX 637 (ALT 250 FOR IP): Performed by: STUDENT IN AN ORGANIZED HEALTH CARE EDUCATION/TRAINING PROGRAM

## 2020-01-01 PROCEDURE — 85652 RBC SED RATE AUTOMATED: CPT

## 2020-01-01 PROCEDURE — 6360000002 HC RX W HCPCS: Performed by: PHYSICIAN ASSISTANT

## 2020-01-01 PROCEDURE — 4040F PNEUMOC VAC/ADMIN/RCVD: CPT | Performed by: INTERNAL MEDICINE

## 2020-01-01 PROCEDURE — 93306 TTE W/DOPPLER COMPLETE: CPT

## 2020-01-01 PROCEDURE — 2060000000 HC ICU INTERMEDIATE R&B

## 2020-01-01 PROCEDURE — 81001 URINALYSIS AUTO W/SCOPE: CPT

## 2020-01-01 PROCEDURE — 84484 ASSAY OF TROPONIN QUANT: CPT

## 2020-01-01 PROCEDURE — 1123F ACP DISCUSS/DSCN MKR DOCD: CPT | Performed by: NURSE PRACTITIONER

## 2020-01-01 PROCEDURE — 36415 COLL VENOUS BLD VENIPUNCTURE: CPT

## 2020-01-01 PROCEDURE — 6360000002 HC RX W HCPCS: Performed by: INTERNAL MEDICINE

## 2020-01-01 PROCEDURE — G8427 DOCREV CUR MEDS BY ELIG CLIN: HCPCS | Performed by: NURSE PRACTITIONER

## 2020-01-01 PROCEDURE — 93308 TTE F-UP OR LMTD: CPT

## 2020-01-01 PROCEDURE — 93000 ELECTROCARDIOGRAM COMPLETE: CPT | Performed by: NURSE PRACTITIONER

## 2020-01-01 PROCEDURE — 4040F PNEUMOC VAC/ADMIN/RCVD: CPT | Performed by: NURSE PRACTITIONER

## 2020-01-01 PROCEDURE — 80048 BASIC METABOLIC PNL TOTAL CA: CPT

## 2020-01-01 PROCEDURE — 2580000003 HC RX 258: Performed by: PHYSICIAN ASSISTANT

## 2020-01-01 PROCEDURE — 71045 X-RAY EXAM CHEST 1 VIEW: CPT

## 2020-01-01 PROCEDURE — 82803 BLOOD GASES ANY COMBINATION: CPT

## 2020-01-01 PROCEDURE — 99223 1ST HOSP IP/OBS HIGH 75: CPT | Performed by: INTERNAL MEDICINE

## 2020-01-01 PROCEDURE — 94640 AIRWAY INHALATION TREATMENT: CPT

## 2020-01-01 PROCEDURE — 99442 PR PHYS/QHP TELEPHONE EVALUATION 11-20 MIN: CPT | Performed by: NURSE PRACTITIONER

## 2020-01-01 PROCEDURE — 85027 COMPLETE CBC AUTOMATED: CPT

## 2020-01-01 PROCEDURE — 6370000000 HC RX 637 (ALT 250 FOR IP): Performed by: PHYSICIAN ASSISTANT

## 2020-01-01 PROCEDURE — 1123F ACP DISCUSS/DSCN MKR DOCD: CPT | Performed by: INTERNAL MEDICINE

## 2020-01-01 PROCEDURE — 83880 ASSAY OF NATRIURETIC PEPTIDE: CPT

## 2020-01-01 PROCEDURE — 86140 C-REACTIVE PROTEIN: CPT

## 2020-01-01 PROCEDURE — 85730 THROMBOPLASTIN TIME PARTIAL: CPT

## 2020-01-01 PROCEDURE — 1036F TOBACCO NON-USER: CPT | Performed by: INTERNAL MEDICINE

## 2020-01-01 PROCEDURE — 94761 N-INVAS EAR/PLS OXIMETRY MLT: CPT

## 2020-01-01 PROCEDURE — 99214 OFFICE O/P EST MOD 30 MIN: CPT | Performed by: NURSE PRACTITIONER

## 2020-01-01 PROCEDURE — G8417 CALC BMI ABV UP PARAM F/U: HCPCS | Performed by: NURSE PRACTITIONER

## 2020-01-01 PROCEDURE — G8417 CALC BMI ABV UP PARAM F/U: HCPCS | Performed by: INTERNAL MEDICINE

## 2020-01-01 PROCEDURE — 70450 CT HEAD/BRAIN W/O DYE: CPT

## 2020-01-01 PROCEDURE — 99214 OFFICE O/P EST MOD 30 MIN: CPT | Performed by: INTERNAL MEDICINE

## 2020-01-01 PROCEDURE — 1036F TOBACCO NON-USER: CPT | Performed by: NURSE PRACTITIONER

## 2020-01-01 PROCEDURE — 99232 SBSQ HOSP IP/OBS MODERATE 35: CPT | Performed by: INTERNAL MEDICINE

## 2020-01-01 PROCEDURE — G8484 FLU IMMUNIZE NO ADMIN: HCPCS | Performed by: NURSE PRACTITIONER

## 2020-01-01 PROCEDURE — 99233 SBSQ HOSP IP/OBS HIGH 50: CPT | Performed by: INTERNAL MEDICINE

## 2020-01-01 PROCEDURE — 93005 ELECTROCARDIOGRAM TRACING: CPT | Performed by: STUDENT IN AN ORGANIZED HEALTH CARE EDUCATION/TRAINING PROGRAM

## 2020-01-01 PROCEDURE — G8427 DOCREV CUR MEDS BY ELIG CLIN: HCPCS | Performed by: INTERNAL MEDICINE

## 2020-01-01 RX ORDER — METOPROLOL SUCCINATE 50 MG/1
50 TABLET, EXTENDED RELEASE ORAL NIGHTLY
Status: DISCONTINUED | OUTPATIENT
Start: 2020-01-01 | End: 2021-01-01 | Stop reason: HOSPADM

## 2020-01-01 RX ORDER — LISINOPRIL 5 MG/1
2.5 TABLET ORAL DAILY
Status: DISCONTINUED | OUTPATIENT
Start: 2020-01-01 | End: 2021-01-01 | Stop reason: HOSPADM

## 2020-01-01 RX ORDER — CLOPIDOGREL BISULFATE 75 MG/1
75 TABLET ORAL DAILY
Status: DISCONTINUED | OUTPATIENT
Start: 2020-01-01 | End: 2020-01-01

## 2020-01-01 RX ORDER — HEPARIN SODIUM 10000 [USP'U]/100ML
12 INJECTION, SOLUTION INTRAVENOUS CONTINUOUS
Status: DISCONTINUED | OUTPATIENT
Start: 2020-01-01 | End: 2020-01-01

## 2020-01-01 RX ORDER — IPRATROPIUM BROMIDE AND ALBUTEROL SULFATE 2.5; .5 MG/3ML; MG/3ML
1 SOLUTION RESPIRATORY (INHALATION)
Status: DISCONTINUED | OUTPATIENT
Start: 2020-01-01 | End: 2020-01-01 | Stop reason: SDUPTHER

## 2020-01-01 RX ORDER — MORPHINE SULFATE 100 MG/5ML
10 SOLUTION ORAL
Qty: 15 ML | Refills: 0 | Status: SHIPPED | OUTPATIENT
Start: 2020-01-01 | End: 2021-01-01

## 2020-01-01 RX ORDER — METOPROLOL SUCCINATE 50 MG/1
50 TABLET, EXTENDED RELEASE ORAL NIGHTLY
Qty: 30 TABLET | Refills: 3 | Status: SHIPPED | OUTPATIENT
Start: 2020-01-01 | End: 2021-01-01

## 2020-01-01 RX ORDER — HEPARIN SODIUM 1000 [USP'U]/ML
30 INJECTION, SOLUTION INTRAVENOUS; SUBCUTANEOUS PRN
Status: DISCONTINUED | OUTPATIENT
Start: 2020-01-01 | End: 2021-01-01 | Stop reason: HOSPADM

## 2020-01-01 RX ORDER — METHOCARBAMOL 500 MG/1
1000 TABLET, FILM COATED ORAL EVERY 6 HOURS PRN
Status: DISCONTINUED | OUTPATIENT
Start: 2020-01-01 | End: 2021-01-01 | Stop reason: HOSPADM

## 2020-01-01 RX ORDER — SPIRONOLACTONE 25 MG/1
12.5 TABLET ORAL DAILY
Status: DISCONTINUED | OUTPATIENT
Start: 2020-01-01 | End: 2021-01-01 | Stop reason: HOSPADM

## 2020-01-01 RX ORDER — LORAZEPAM 2 MG/ML
1 CONCENTRATE ORAL EVERY 8 HOURS PRN
Qty: 30 ML | Refills: 0 | Status: SHIPPED | OUTPATIENT
Start: 2020-01-01 | End: 2021-01-14

## 2020-01-01 RX ORDER — PRAVASTATIN SODIUM 40 MG
40 TABLET ORAL DAILY
Qty: 90 TABLET | Refills: 0 | Status: SHIPPED | OUTPATIENT
Start: 2020-01-01 | End: 2020-01-01

## 2020-01-01 RX ORDER — POTASSIUM CHLORIDE 7.45 MG/ML
10 INJECTION INTRAVENOUS PRN
Status: DISCONTINUED | OUTPATIENT
Start: 2020-01-01 | End: 2021-01-01 | Stop reason: HOSPADM

## 2020-01-01 RX ORDER — NICOTINE POLACRILEX 4 MG
15 LOZENGE BUCCAL PRN
Status: DISCONTINUED | OUTPATIENT
Start: 2020-01-01 | End: 2021-01-01 | Stop reason: HOSPADM

## 2020-01-01 RX ORDER — INSULIN LISPRO 100 [IU]/ML
5 INJECTION, SOLUTION INTRAVENOUS; SUBCUTANEOUS
Status: DISCONTINUED | OUTPATIENT
Start: 2020-01-01 | End: 2021-01-01 | Stop reason: HOSPADM

## 2020-01-01 RX ORDER — INSULIN LISPRO 100 [IU]/ML
0-9 INJECTION, SOLUTION INTRAVENOUS; SUBCUTANEOUS NIGHTLY
Status: DISCONTINUED | OUTPATIENT
Start: 2020-01-01 | End: 2021-01-01 | Stop reason: HOSPADM

## 2020-01-01 RX ORDER — SPIRONOLACTONE 25 MG/1
12.5 TABLET ORAL DAILY
Qty: 30 TABLET | Refills: 3 | Status: SHIPPED | OUTPATIENT
Start: 2021-01-01 | End: 2021-01-01 | Stop reason: HOSPADM

## 2020-01-01 RX ORDER — ATORVASTATIN CALCIUM 40 MG/1
40 TABLET, FILM COATED ORAL NIGHTLY
Status: DISCONTINUED | OUTPATIENT
Start: 2020-01-01 | End: 2021-01-01 | Stop reason: HOSPADM

## 2020-01-01 RX ORDER — CARVEDILOL 6.25 MG/1
TABLET ORAL
Qty: 180 TABLET | Refills: 0 | Status: SHIPPED | OUTPATIENT
Start: 2020-01-01 | End: 2020-01-01

## 2020-01-01 RX ORDER — METOPROLOL SUCCINATE 25 MG/1
25 TABLET, EXTENDED RELEASE ORAL DAILY
Qty: 30 TABLET | Refills: 3 | Status: ON HOLD
Start: 2020-01-01 | End: 2020-01-01 | Stop reason: HOSPADM

## 2020-01-01 RX ORDER — LIDOCAINE 4 G/G
1 PATCH TOPICAL DAILY
Status: DISCONTINUED | OUTPATIENT
Start: 2020-01-01 | End: 2021-01-01 | Stop reason: HOSPADM

## 2020-01-01 RX ORDER — ASPIRIN 81 MG/1
81 TABLET, CHEWABLE ORAL DAILY
Status: DISCONTINUED | OUTPATIENT
Start: 2020-01-01 | End: 2021-01-01 | Stop reason: HOSPADM

## 2020-01-01 RX ORDER — FUROSEMIDE 20 MG/1
TABLET ORAL
Qty: 90 TABLET | Refills: 2 | Status: SHIPPED | OUTPATIENT
Start: 2020-01-01

## 2020-01-01 RX ORDER — ISOSORBIDE MONONITRATE 60 MG/1
TABLET, EXTENDED RELEASE ORAL
Qty: 90 TABLET | Refills: 0 | Status: SHIPPED | OUTPATIENT
Start: 2020-01-01 | End: 2020-01-01 | Stop reason: SDUPTHER

## 2020-01-01 RX ORDER — ISOSORBIDE MONONITRATE 30 MG/1
30 TABLET, EXTENDED RELEASE ORAL DAILY
Qty: 30 TABLET | Refills: 3 | Status: SHIPPED | OUTPATIENT
Start: 2021-01-01

## 2020-01-01 RX ORDER — METHOCARBAMOL 500 MG/1
1000 TABLET, FILM COATED ORAL EVERY 6 HOURS PRN
Qty: 30 TABLET | Refills: 0 | Status: SHIPPED | OUTPATIENT
Start: 2020-01-01 | End: 2021-01-10

## 2020-01-01 RX ORDER — CETIRIZINE HYDROCHLORIDE 10 MG/1
10 TABLET ORAL DAILY
COMMUNITY

## 2020-01-01 RX ORDER — ISOSORBIDE MONONITRATE 30 MG/1
30 TABLET, EXTENDED RELEASE ORAL DAILY
Status: DISCONTINUED | OUTPATIENT
Start: 2020-01-01 | End: 2021-01-01 | Stop reason: HOSPADM

## 2020-01-01 RX ORDER — DEXTROSE MONOHYDRATE 25 G/50ML
12.5 INJECTION, SOLUTION INTRAVENOUS PRN
Status: DISCONTINUED | OUTPATIENT
Start: 2020-01-01 | End: 2021-01-01 | Stop reason: HOSPADM

## 2020-01-01 RX ORDER — ACETAMINOPHEN 500 MG
1000 TABLET ORAL EVERY 6 HOURS PRN
Status: DISCONTINUED | OUTPATIENT
Start: 2020-01-01 | End: 2021-01-01 | Stop reason: HOSPADM

## 2020-01-01 RX ORDER — IPRATROPIUM BROMIDE AND ALBUTEROL SULFATE 2.5; .5 MG/3ML; MG/3ML
1 SOLUTION RESPIRATORY (INHALATION)
Status: DISCONTINUED | OUTPATIENT
Start: 2020-01-01 | End: 2021-01-01 | Stop reason: HOSPADM

## 2020-01-01 RX ORDER — RANOLAZINE 500 MG/1
500 TABLET, EXTENDED RELEASE ORAL 2 TIMES DAILY
Qty: 60 TABLET | Refills: 3 | Status: SHIPPED | OUTPATIENT
Start: 2020-01-01 | End: 2020-01-01 | Stop reason: ALTCHOICE

## 2020-01-01 RX ORDER — HEPARIN SODIUM 1000 [USP'U]/ML
60 INJECTION, SOLUTION INTRAVENOUS; SUBCUTANEOUS PRN
Status: DISCONTINUED | OUTPATIENT
Start: 2020-01-01 | End: 2021-01-01 | Stop reason: HOSPADM

## 2020-01-01 RX ORDER — DEXTROSE MONOHYDRATE 50 MG/ML
100 INJECTION, SOLUTION INTRAVENOUS PRN
Status: DISCONTINUED | OUTPATIENT
Start: 2020-01-01 | End: 2021-01-01 | Stop reason: HOSPADM

## 2020-01-01 RX ORDER — KETOROLAC TROMETHAMINE 30 MG/ML
15 INJECTION, SOLUTION INTRAMUSCULAR; INTRAVENOUS ONCE
Status: COMPLETED | OUTPATIENT
Start: 2020-01-01 | End: 2020-01-01

## 2020-01-01 RX ORDER — CARVEDILOL 6.25 MG/1
6.25 TABLET ORAL 2 TIMES DAILY WITH MEALS
Qty: 60 TABLET | Refills: 5 | Status: SHIPPED | OUTPATIENT
Start: 2020-01-01 | End: 2020-01-01

## 2020-01-01 RX ORDER — ISOSORBIDE MONONITRATE 30 MG/1
30 TABLET, EXTENDED RELEASE ORAL DAILY
Qty: 90 TABLET | Refills: 3 | Status: ON HOLD | OUTPATIENT
Start: 2020-01-01 | End: 2020-01-01

## 2020-01-01 RX ORDER — ISOSORBIDE MONONITRATE 60 MG/1
60 TABLET, EXTENDED RELEASE ORAL DAILY
Qty: 90 TABLET | Refills: 2 | Status: SHIPPED | OUTPATIENT
Start: 2020-01-01 | End: 2020-01-01

## 2020-01-01 RX ORDER — PRAVASTATIN SODIUM 40 MG
TABLET ORAL
Qty: 90 TABLET | Refills: 0 | Status: SHIPPED | OUTPATIENT
Start: 2020-01-01 | End: 2020-01-01

## 2020-01-01 RX ORDER — 0.9 % SODIUM CHLORIDE 0.9 %
500 INTRAVENOUS SOLUTION INTRAVENOUS ONCE
Status: COMPLETED | OUTPATIENT
Start: 2020-01-01 | End: 2021-01-01

## 2020-01-01 RX ORDER — INSULIN LISPRO 100 [IU]/ML
0-18 INJECTION, SOLUTION INTRAVENOUS; SUBCUTANEOUS
Status: DISCONTINUED | OUTPATIENT
Start: 2020-01-01 | End: 2021-01-01 | Stop reason: HOSPADM

## 2020-01-01 RX ORDER — LISINOPRIL 2.5 MG/1
2.5 TABLET ORAL DAILY
Qty: 30 TABLET | Refills: 3 | Status: SHIPPED | OUTPATIENT
Start: 2021-01-01 | End: 2021-01-01

## 2020-01-01 RX ORDER — HEPARIN SODIUM 1000 [USP'U]/ML
60 INJECTION, SOLUTION INTRAVENOUS; SUBCUTANEOUS ONCE
Status: DISCONTINUED | OUTPATIENT
Start: 2020-01-01 | End: 2020-01-01

## 2020-01-01 RX ORDER — NITROGLYCERIN 0.4 MG/1
0.4 TABLET SUBLINGUAL EVERY 5 MIN PRN
Qty: 25 TABLET | Refills: 1 | Status: SHIPPED | OUTPATIENT
Start: 2020-01-01

## 2020-01-01 RX ADMIN — ATORVASTATIN CALCIUM 40 MG: 40 TABLET, FILM COATED ORAL at 21:15

## 2020-01-01 RX ADMIN — IPRATROPIUM BROMIDE AND ALBUTEROL SULFATE 1 AMPULE: .5; 3 SOLUTION RESPIRATORY (INHALATION) at 09:14

## 2020-01-01 RX ADMIN — SPIRONOLACTONE 12.5 MG: 25 TABLET ORAL at 16:58

## 2020-01-01 RX ADMIN — ISOSORBIDE MONONITRATE 30 MG: 30 TABLET, EXTENDED RELEASE ORAL at 09:50

## 2020-01-01 RX ADMIN — INSULIN LISPRO 5 UNITS: 100 INJECTION, SOLUTION INTRAVENOUS; SUBCUTANEOUS at 17:33

## 2020-01-01 RX ADMIN — INSULIN LISPRO 5 UNITS: 100 INJECTION, SOLUTION INTRAVENOUS; SUBCUTANEOUS at 12:18

## 2020-01-01 RX ADMIN — ASPIRIN 81 MG 81 MG: 81 TABLET ORAL at 10:07

## 2020-01-01 RX ADMIN — INSULIN LISPRO 18 UNITS: 100 INJECTION, SOLUTION INTRAVENOUS; SUBCUTANEOUS at 15:01

## 2020-01-01 RX ADMIN — INSULIN LISPRO 5 UNITS: 100 INJECTION, SOLUTION INTRAVENOUS; SUBCUTANEOUS at 18:10

## 2020-01-01 RX ADMIN — APIXABAN 2.5 MG: 5 TABLET, FILM COATED ORAL at 21:15

## 2020-01-01 RX ADMIN — HEPARIN SODIUM AND DEXTROSE 10 UNITS/KG/HR: 10000; 5 INJECTION INTRAVENOUS at 15:00

## 2020-01-01 RX ADMIN — SODIUM CHLORIDE 500 ML: 9 INJECTION, SOLUTION INTRAVENOUS at 23:27

## 2020-01-01 RX ADMIN — IPRATROPIUM BROMIDE AND ALBUTEROL SULFATE 1 AMPULE: .5; 3 SOLUTION RESPIRATORY (INHALATION) at 20:29

## 2020-01-01 RX ADMIN — LISINOPRIL 2.5 MG: 5 TABLET ORAL at 10:07

## 2020-01-01 RX ADMIN — INSULIN LISPRO 3 UNITS: 100 INJECTION, SOLUTION INTRAVENOUS; SUBCUTANEOUS at 09:10

## 2020-01-01 RX ADMIN — CLOPIDOGREL BISULFATE 75 MG: 75 TABLET ORAL at 09:50

## 2020-01-01 RX ADMIN — INSULIN LISPRO 5 UNITS: 100 INJECTION, SOLUTION INTRAVENOUS; SUBCUTANEOUS at 21:01

## 2020-01-01 RX ADMIN — INSULIN GLARGINE 10 UNITS: 100 INJECTION, SOLUTION SUBCUTANEOUS at 21:15

## 2020-01-01 RX ADMIN — ACETAMINOPHEN 1000 MG: 500 TABLET, FILM COATED ORAL at 17:53

## 2020-01-01 RX ADMIN — SPIRONOLACTONE 12.5 MG: 25 TABLET ORAL at 09:52

## 2020-01-01 RX ADMIN — INSULIN LISPRO 3 UNITS: 100 INJECTION, SOLUTION INTRAVENOUS; SUBCUTANEOUS at 18:09

## 2020-01-01 RX ADMIN — ASPIRIN 81 MG 81 MG: 81 TABLET ORAL at 09:50

## 2020-01-01 RX ADMIN — INSULIN GLARGINE 10 UNITS: 100 INJECTION, SOLUTION SUBCUTANEOUS at 12:16

## 2020-01-01 RX ADMIN — INSULIN GLARGINE 10 UNITS: 100 INJECTION, SOLUTION SUBCUTANEOUS at 18:29

## 2020-01-01 RX ADMIN — ATORVASTATIN CALCIUM 40 MG: 40 TABLET, FILM COATED ORAL at 20:32

## 2020-01-01 RX ADMIN — HEPARIN SODIUM 2650 UNITS: 1000 INJECTION INTRAVENOUS; SUBCUTANEOUS at 03:17

## 2020-01-01 RX ADMIN — INSULIN LISPRO 18 UNITS: 100 INJECTION, SOLUTION INTRAVENOUS; SUBCUTANEOUS at 17:34

## 2020-01-01 RX ADMIN — INSULIN LISPRO 5 UNITS: 100 INJECTION, SOLUTION INTRAVENOUS; SUBCUTANEOUS at 17:00

## 2020-01-01 RX ADMIN — HEPARIN SODIUM 2650 UNITS: 1000 INJECTION INTRAVENOUS; SUBCUTANEOUS at 16:56

## 2020-01-01 RX ADMIN — INSULIN LISPRO 3 UNITS: 100 INJECTION, SOLUTION INTRAVENOUS; SUBCUTANEOUS at 22:20

## 2020-01-01 RX ADMIN — CLOPIDOGREL BISULFATE 75 MG: 75 TABLET ORAL at 10:07

## 2020-01-01 RX ADMIN — METOPROLOL SUCCINATE 50 MG: 50 TABLET, EXTENDED RELEASE ORAL at 20:32

## 2020-01-01 RX ADMIN — ACETAMINOPHEN 1000 MG: 500 TABLET, FILM COATED ORAL at 20:32

## 2020-01-01 RX ADMIN — KETOROLAC TROMETHAMINE 15 MG: 30 INJECTION, SOLUTION INTRAMUSCULAR at 00:15

## 2020-01-01 RX ADMIN — SPIRONOLACTONE 12.5 MG: 25 TABLET ORAL at 10:07

## 2020-01-01 RX ADMIN — ISOSORBIDE MONONITRATE 30 MG: 30 TABLET, EXTENDED RELEASE ORAL at 10:07

## 2020-01-01 RX ADMIN — METOPROLOL SUCCINATE 50 MG: 50 TABLET, EXTENDED RELEASE ORAL at 21:01

## 2020-01-01 RX ADMIN — METHOCARBAMOL 1000 MG: 500 TABLET ORAL at 00:15

## 2020-01-01 RX ADMIN — INSULIN LISPRO 6 UNITS: 100 INJECTION, SOLUTION INTRAVENOUS; SUBCUTANEOUS at 12:18

## 2020-01-01 RX ADMIN — INSULIN GLARGINE 10 UNITS: 100 INJECTION, SOLUTION SUBCUTANEOUS at 22:20

## 2020-01-01 RX ADMIN — LISINOPRIL 2.5 MG: 5 TABLET ORAL at 09:50

## 2020-01-01 RX ADMIN — INSULIN LISPRO 6 UNITS: 100 INJECTION, SOLUTION INTRAVENOUS; SUBCUTANEOUS at 17:00

## 2020-01-01 RX ADMIN — APIXABAN 2.5 MG: 5 TABLET, FILM COATED ORAL at 12:55

## 2020-01-01 RX ADMIN — ATORVASTATIN CALCIUM 40 MG: 40 TABLET, FILM COATED ORAL at 21:01

## 2020-01-01 RX ADMIN — HEPARIN SODIUM AND DEXTROSE 14 UNITS/KG/HR: 10000; 5 INJECTION INTRAVENOUS at 16:56

## 2020-01-01 RX ADMIN — INSULIN HUMAN 10 UNITS: 100 INJECTION, SOLUTION PARENTERAL at 16:53

## 2020-01-01 ASSESSMENT — PAIN SCALES - GENERAL
PAINLEVEL_OUTOF10: 5
PAINLEVEL_OUTOF10: 7
PAINLEVEL_OUTOF10: 2
PAINLEVEL_OUTOF10: 2
PAINLEVEL_OUTOF10: 10

## 2020-04-16 NOTE — TELEPHONE ENCOUNTER
Wife states in their area covid is high and not sure what to do about getting labs and appt in within the next 30 days for additional refills. Please call to advise.

## 2020-05-13 NOTE — TELEPHONE ENCOUNTER
Spoke to NPTS today and got rx's sent in . One of his rx's was missing . Please send in refill for his pravastatin to same pharmacy as other meds . Wife is waiting for this to be done so she can go  rx's.

## 2020-05-13 NOTE — PROGRESS NOTES
undressing getting ready for bed. He has no associated symptoms. It goes away readily after taking NTG. The times he takes a NTG, is on the days he doesn't take lasix. He doesn't swell but he breaths more swallow. He doesn't complain of feeling SOB. He's been coughing a lot since this past fall. They don't know if he's been aspirating. His wt is down ~ 5-7# to 202# the past couple of weeks. He had a week when his pulse dropped below 50 (40-47); his eyes appeared to had been rolling. The patient denies orthopnea/PND. The patient is not experiencing palpitations. These symptoms are new since the last OV. With regard to medication therapy the patient has been compliant with prescribed regimen. They have tolerated therapy to date. Past Medical History:   Diagnosis Date    CAD (coronary artery disease) calcifications    2004 MI    Cancer ([facility] [address]     hands and face, left ear squamous cell    Chronic diastolic heart failure (HCC)     Diabetes type 2, controlled ([facility] [address]     Hyperlipidemia     Hypertension     Obesity (BMI 30-39. 9)     Unspecified cerebral artery occlusion with cerebral infarction     CVA 4-2001 no residual     Social History:    Social History     Tobacco Use   Smoking Status Never Smoker   Smokeless Tobacco Never Used     Current Medications:  Current Outpatient Medications   Medication Sig Dispense Refill    carvedilol (COREG) 6.25 MG tablet Take 1 tablet by mouth 2 times daily (with meals) 60 tablet 5    furosemide (LASIX) 20 MG tablet TAKE 1 TABLET MONDAY, WED, AND FRIDAY 90 tablet 2    isosorbide mononitrate (IMDUR) 60 MG extended release tablet Take 1 tablet by mouth daily 90 tablet 2           pravastatin (PRAVACHOL) 40 MG tablet TAKE 1 TABLET BY MOUTH DAILY 30 tablet 0    nitroGLYCERIN (NITROSTAT) 0.4 MG SL tablet Place 1 tablet under the tongue every 5 minutes as needed for Chest pain 25 tablet 3    Multiple Vitamins-Minerals (PRESERVISION AREDS 2) CAPS Take  by mouth

## 2020-05-13 NOTE — TELEPHONE ENCOUNTER
Pended a refill request for your approval for Pravastatin 40 mg #30. Pt needs labs for additional refills.

## 2020-06-10 NOTE — PROGRESS NOTES
minutes as needed for Chest pain 25 tablet 3    Multiple Vitamins-Minerals (PRESERVISION AREDS 2) CAPS Take  by mouth 2 times daily.  loratadine (CLARITIN) 10 MG tablet Take 10 mg by mouth daily.  vitamin B-12 (CYANOCOBALAMIN) 1000 MCG tablet Take 1,000 mcg by mouth daily.  clopidogrel (PLAVIX) 75 MG tablet Take 75 mg by mouth daily.  insulin aspart protamine-insulin aspart (NOVOLOG 70/30) (70-30) 100 UNIT/ML injection Inject into the skin 2 times daily (with meals) 16 in the am ,16 qpm only with meals adjusts accordingly      acetaminophen (TYLENOL) 500 MG tablet Take 500 mg by mouth 2 times daily       aspirin 81 MG EC tablet Take 81 mg by mouth daily.  glipiZIDE (GLUCOTROL) 5 MG tablet Take 1 tablet by mouth 2 times daily (before meals). 60 tablet 6     No current facility-administered medications for this visit. REVIEW OF SYSTEMS:    CONSTITUTIONAL: No major weight gain or loss, fatigue, weakness, night sweats or fever. HEENT: No new vision difficulties or ringing in the ears. RESPIRATORY: No new SOB, PND, orthopnea; + cough. CARDIOVASCULAR: See HPI  GI: No nausea, vomiting, diarrhea, constipation, abdominal pain or changes in bowel habits. : No urinary frequency, urgency, incontinence hematuria or dysuria. SKIN: No cyanosis or skin lesions. MUSCULOSKELETAL: No new muscle or joint pain. NEUROLOGICAL: No syncope or TIA-like symptoms; + light headed.   PSYCHIATRIC: No anxiety, pain, insomnia or depression    Objective:       VITALS:  135/63 pulse 37 wt 202.3#      DATA:      LABS: 2/11/20:   Na+ 144 K+ 4.8 chl 102 CO2 27 BUN 18 creatinine 0.8 glu 135     6/2/20:   W 8.8 H/H 16.4 / 50.0   Na+ 140 K+ 4.3 chl 104 CO2 26 BUN 16 creatinine 1.13 glu 119   PSA 4.95   A1c 6.7%    trig 151 HDL 38 LDL 97    Radiology Review:  Pertinent images / reports were reviewed as a part of this visit and reveals the following:    Last Echo: Oct '18:  Summary   -Left ventricular standpoint    I have addresed the patient's cardiac risk factors and adjusted pharmacologic treatment as needed. In addition, I have reinforced the need for patient directed risk factor modification. Further evaluation will be based upon the patient's clinical course and testing results. All questions and concerns were addressed to the patient/wife & daughter Richa Olmos). Alternatives to my treatment were discussed. The patient is not currently smoking. The risks related to smoking were reviewed with the patient. Recommend maintaining a smoke-free lifestyle. Patient is on a beta-blocker  Patient is not on an ace-i/ARB : intolerant to lisinopril with persistent cough & low BP  Patient is on a statin    Dual Antiplatelet therapy has been recommended / prescribed for this patient. Education conducted on adverse reactions including bleeding was discussed. The patient verbalizes understanding not to stop medications without discussing with us. Discussed exercise: 30-60 minutes 7 days/week : limited d/t knee pain; s/p Rt THR and Lt TKR  Discussed Low saturated fat diet. SMBG 143    Thank you for allowing to us to participate in the care of [facility].     JJ Pearson    Documentation of today's visit sent to PCP

## 2020-06-25 NOTE — TELEPHONE ENCOUNTER
Patient can't do a virtual visit if he does not have video capabilities. He can be added to DCE schedule on 7/16 @9419 in the office.  Thanks

## 2020-07-13 NOTE — TELEPHONE ENCOUNTER
Last ov TS VV  6/10/20     . Coronary artery disease involving native coronary artery of native heart without angina pectoris  ~improved on Ranexa : has not needed to take NTG SL over the past month  ~wt stabilized; has no reported swelling   ~s/p CABG '11  ~SVG grafts occl, LIMA patent by cath '12  ~patent LIMA > LAD by cath '16  ~DAPT / nitrate / statin / BB / Ranexa     2. Nonrheumatic aortic valve stenosis   ~stable by echo Oct '18  ~s/p AVR '11 bioprosthetic   ~LA 4.7 cm by echo  ~ASA      3. Essential hypertension   ~controlled     4.  Mixed hyperlipidemia   ~improved on latest profile with trig 151  ~last profile Oct '19:  trig 219 HDL 38 LDL 67  ~pravastatin

## 2020-07-13 NOTE — TELEPHONE ENCOUNTER
Pt wife calling. Pt is having a hard time taking Ranexa. Pt is very tired, sob, dizzy, pt feels faint all the time. Pt's feet, legs and stomach were swollen last night. They are unable to weigh pt. Please call to advise what to do.

## 2020-07-14 NOTE — PROGRESS NOTES
[address]    H+P // CONSULT // OUTPATIENT VISIT // FOLLOWUP VISIT     Referring Doctor SAGAR LOPEZ [facility]   Encounter Type Followup     CHIEF COMPLAINT     Visit Type Chronic   Symptoms None   Problems AS, CAD, HTN, CHOL, AFIB, CVA     HISTORY OF PRESENT ILLNESS      GEN - Doing fair. CP once per week, relieved with nitro. SOB with little activity but patient extremely sedentary. Occasional dizziness.  CAD - Denies syncope, palpitations. CP and sob as above   HTN - Ambulatory BP readings in good range. No HA or dizziness.  CHOL - Last cholesterol reviewed and in good range. Tolerating statin without side effects.  MED - Compliant with CV meds listed below without notable side effects. HISTORY/ALLERGIES/ROS     MedHx:   has a past medical history of CAD (coronary artery disease), Cancer ([facility] [place] [address] Chronic diastolic heart failure ([facility] [place] [address] Diabetes type 2, controlled ([facility] [place] [address] Hyperlipidemia, Hypertension, Obesity (BMI 30-39.9), and Unspecified cerebral artery occlusion with cerebral infarction. SurgHx:  has a past surgical history that includes hiatal hernia repair; other surgical history; Cholecystectomy; Carpal tunnel release; other surgical history (fall R leg shorter); hernia repair; joint replacement; Appendectomy; Coronary artery bypass graft (6/2011); Aortic valve surgery (6/2011); Eye surgery (10/21/15); eye surgery (9/21/15); and Skin cancer excision. SocHx:   reports that he has never smoked. He has never used smokeless tobacco. He reports that he does not drink alcohol or use drugs. FamHx:  family history includes Cancer in his brother and father; Diabetes in his mother; Heart Disease in his brother and mother; High Blood Pressure in his brother, mother, and sister; High Cholesterol in his brother and sister.    Allergies: Adhesive tape; Cortisone; and Percocet [oxycodone-acetaminophen]   ROS:  [x]Full ROS obtained and negative except as mentioned in HPI     MEDICATIONS Current Outpatient Medications   Medication Sig Dispense Refill    carvedilol (COREG) 6.25 MG tablet Take 1 tablet by mouth 2 times daily (with meals) 60 tablet 5    furosemide (LASIX) 20 MG tablet TAKE 1 TABLET MONDAY, WED, AND FRIDAY 90 tablet 2    isosorbide mononitrate (IMDUR) 60 MG extended release tablet Take 1 tablet by mouth daily 90 tablet 2    ranolazine (RANEXA) 500 MG extended release tablet Take 1 tablet by mouth 2 times daily 60 tablet 3    pravastatin (PRAVACHOL) 40 MG tablet Take 1 tablet by mouth daily 90 tablet 0    nitroGLYCERIN (NITROSTAT) 0.4 MG SL tablet Place 1 tablet under the tongue every 5 minutes as needed for Chest pain 25 tablet 3    Multiple Vitamins-Minerals (PRESERVISION AREDS 2) CAPS Take  by mouth 2 times daily.  loratadine (CLARITIN) 10 MG tablet Take 10 mg by mouth daily.  vitamin B-12 (CYANOCOBALAMIN) 1000 MCG tablet Take 1,000 mcg by mouth daily.  clopidogrel (PLAVIX) 75 MG tablet Take 75 mg by mouth daily.  insulin aspart protamine-insulin aspart (NOVOLOG 70/30) (70-30) 100 UNIT/ML injection Inject into the skin 2 times daily (with meals) 16 in the am ,16 qpm only with meals adjusts accordingly      acetaminophen (TYLENOL) 500 MG tablet Take 500 mg by mouth 2 times daily       aspirin 81 MG EC tablet Take 81 mg by mouth daily.  glipiZIDE (GLUCOTROL) 5 MG tablet Take 1 tablet by mouth 2 times daily (before meals). 60 tablet 6     No current facility-administered medications for this visit.       Reviewed with patient and will remain unchanged except as mentioned in A/P  PHYSICAL EXAM     Vitals:    07/16/20 1238   BP: 132/84   Pulse: 69   Resp: 18   SpO2: 98%      Gen Alert, coop, no distress Heart  Rrr, 2/6   Head NC, AT, no abnorm Abd  Soft, NT, +BS, no mass, no OM   Eyes PER, conj/corn clear Ext  Ext nl, AT, no C/C/E   Nose Nares nl, no drain, NT Pulse reduced   Throat Lips, mucosa, tongue nl Skin Col/text/turg nl, no vis rash/les Neck S/S, TM, NT, no bruit/JVD Psych Nl mood and affect   Lung CTA-B, unlabored, no DTP Lymph   No cervical or axillary LA   Ch wall NT, no deform Neuro  Nl gross M/S exam     ASSESSMENT AND PLAN     *AS   Date EF Detail   Sx   Sob, dizziness   Hx 6/11  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 Fabian Labrador)   TTE 10/14  8/16  10/18 55%  55%  40% Bio AV well seated MG 15  Bio AV well seated MG 12  Bio AV well seated MG 17   Plan   Repeat echo   *CAD   Date EF Results   Sx   Sob, dizziness   [facility]   II   Hx 6/11  CABGx3 LIMA-LAD, SVG-OM2, SVG-PDA [place] Labrador)   [facility] 5/11 2/12 2/16  MVD->CABG (Yasmin)  SVG grafts occluded, LIMA patent (Yasmin)  LOZA to LAD patent, medical management Tameka Rosen)   Plan   Continue aggressive medical treatment at doses above  Recheck echo  Refuses [facility] at this time   *HTN  Status Controlled  Plan Counseled on diet/salt/exercise/weight, continue meds at doses above  *CHOL  Status  Uncontrolled with last LDL of 97 (goal <70) and HDL of 38 (6/20)  Plan Counseled on diet/exercise/weight, continue statin, lipid/liver surveillance per PCP  *AFIB  Hx  Postop, 6/20 [facility]- SR w/ PVCs  Plan Continued observation  *CVA  Hx: CVA 10/11: Residual right weakness, riser right foot  Sx: No further neuro concerns  Plan: Continued observation   Continue statin and aspirin and plavix  *COMPLIANCE  Status Compliant  Plan Discussed importance of compliance with meds/diet/salt/exercise; avoid tob/alc/drugs; patient verbalized understanding  *FOLLOWUP  3 months    [address] Shelly Peters, am scribing for and in the presence of Marlee Higgins MD.   Shelly Corley 07/14/20 3:06 PM   Provider Krystal Pittman is working as a scribe for and in the presence of me (Marlee Higgins MD). Working as a scribeShelly may have prepopulated components of this note with my historical  intellectual property under my direct supervision.   Any additions to this intellectual property were performed in my presence and at my direction.   Furthermore, the content and accuracy of this note have been reviewed by me Stevenson Pallas, MD).  7/16/2020 12:17 PM

## 2020-07-14 NOTE — PATIENT INSTRUCTIONS
Decrease Isosorbide mononitrate to 30 mg once a day (1/2 tablet). Continue all medications. Call the office back if you like to schedule a left heart cath with worsening chest pain. Schedule echo.

## 2020-07-16 NOTE — LETTER
[address] [place] [address] Cardiology [place] [address] [address] [id number]  Phone: [phone]  Fax: [phone]    Cristal Shook MD        July 22, 2020     [facility]  No address on file    Patient: Kamala Nielsen  MR Number: [id number]  YOB: 1934  Date of Visit: 7/16/2020    Dear Dr. Jasmin Martinez    H+P // CONSULT // OUTPATIENT VISIT // Clarita Gonzalez     Referring Doctor SAGAR [facility]   Encounter Type Followup     CHIEF COMPLAINT     Visit Type Chronic   Symptoms None   Problems AS, CAD, HTN, CHOL, AFIB, CVA     HISTORY OF PRESENT ILLNESS     ? GEN - Doing fair. CP once per week, relieved with nitro. SOB with little activity but patient extremely sedentary. Occasional dizziness. ? CAD - Denies syncope, palpitations. CP and sob as above  ? HTN - Ambulatory BP readings in good range. No HA or dizziness. ? CHOL - Last cholesterol reviewed and in good range. Tolerating statin without side effects. ? MED - Compliant with CV meds listed below without notable side effects. HISTORY/ALLERGIES/ROS     MedHx:   has a past medical history of CAD (coronary artery disease), Cancer ([address] Chronic diastolic heart failure ([address] Diabetes type 2, controlled ([facility] [address] Hyperlipidemia, Hypertension, Obesity (BMI 30-39.9), and Unspecified cerebral artery occlusion with cerebral infarction. SurgHx:  has a past surgical history that includes hiatal hernia repair; other surgical history; Cholecystectomy; Carpal tunnel release; other surgical history (fall R leg shorter); hernia repair; joint replacement; Appendectomy; Coronary artery bypass graft (6/2011); Aortic valve surgery (6/2011); Eye surgery (10/21/15); eye surgery (9/21/15); and Skin cancer excision. SocHx:   reports that he has never smoked. He has never used smokeless tobacco. He reports that he does not drink alcohol or use drugs. FamHx:  family history includes Cancer in his brother and father; Diabetes in his mother; Heart Disease in his brother and mother; High Blood Pressure in his brother, mother, and sister; High Cholesterol in his brother and sister. Allergies: Adhesive tape; Cortisone; and Percocet [oxycodone-acetaminophen]   ROS:  [x]Full ROS obtained and negative except as mentioned in HPI     MEDICATIONS      Current Outpatient Medications   Medication Sig Dispense Refill    carvedilol (COREG) 6.25 MG tablet Take 1 tablet by mouth 2 times daily (with meals) 60 tablet 5    furosemide (LASIX) 20 MG tablet TAKE 1 TABLET MONDAY, WED, AND FRIDAY 90 tablet 2    isosorbide mononitrate (IMDUR) 60 MG extended release tablet Take 1 tablet by mouth daily 90 tablet 2    ranolazine (RANEXA) 500 MG extended release tablet Take 1 tablet by mouth 2 times daily 60 tablet 3    pravastatin (PRAVACHOL) 40 MG tablet Take 1 tablet by mouth daily 90 tablet 0    nitroGLYCERIN (NITROSTAT) 0.4 MG SL tablet Place 1 tablet under the tongue every 5 minutes as needed for Chest pain 25 tablet 3    Multiple Vitamins-Minerals (PRESERVISION AREDS 2) CAPS Take  by mouth 2 times daily.  loratadine (CLARITIN) 10 MG tablet Take 10 mg by mouth daily.  vitamin B-12 (CYANOCOBALAMIN) 1000 MCG tablet Take 1,000 mcg by mouth daily.  clopidogrel (PLAVIX) 75 MG tablet Take 75 mg by mouth daily.  insulin aspart protamine-insulin aspart (NOVOLOG 70/30) (70-30) 100 UNIT/ML injection Inject into the skin 2 times daily (with meals) 16 in the am ,16 qpm only with meals adjusts accordingly      acetaminophen (TYLENOL) 500 MG tablet Take 500 mg by mouth 2 times daily       aspirin 81 MG EC tablet Take 81 mg by mouth daily.  glipiZIDE (GLUCOTROL) 5 MG tablet Take 1 tablet by mouth 2 times daily (before meals). 60 tablet 6     No current facility-administered medications for this visit. Reviewed with patient and will remain unchanged except as mentioned in A/P  PHYSICAL EXAM     Vitals:    07/16/20 1238   BP: 132/84   Pulse: 69   Resp: 18   SpO2: 98%      Gen Alert, coop, no distress Heart  Rrr, 2/6   Head NC, AT, no abnorm Abd  Soft, NT, +BS, no mass, no OM   Eyes PER, conj/corn clear Ext  Ext nl, AT, no C/C/E   Nose Nares nl, no drain, NT Pulse reduced   Throat Lips, mucosa, tongue nl Skin Col/text/turg nl, no vis rash/les   Neck S/S, TM, NT, no bruit/JVD Psych Nl mood and affect   Lung CTA-B, unlabored, no DTP Lymph   No cervical or axillary LA   Ch wall NT, no deform Neuro  Nl gross M/S exam     ASSESSMENT AND PLAN     *AS   Date EF Detail   Sx   Sob, dizziness   Hx 6/11  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 Anisa Doshi)   TTE 10/14  8/16  10/18 55%  55%  40% Bio AV well seated MG 15  Bio AV well seated MG 12  Bio AV well seated MG 17   Plan   Repeat echo   *CAD   Date EF Results   Sx   Sob, dizziness   [facility]   II   Hx 6/11  CABGx3 LIMA-LAD, SVG-OM2, SVG-PDA Anisa Doshi)   [facility] 5/11 2/12 2/16  MVD->CABG (Yasmin)  SVG grafts occluded, LIMA patent (Yasmin)  LOZA to LAD patent, medical management Norberto Mix)   Plan   Continue aggressive medical treatment at doses above  Recheck echo  Refuses [facility] at this time   *HTN  Status Controlled  Plan Counseled on diet/salt/exercise/weight, continue meds at doses above  *CHOL  Status  Uncontrolled with last LDL of 97 (goal <70) and HDL of 38 (6/20)  Plan Counseled on diet/exercise/weight, continue statin, lipid/liver surveillance per PCP  *AFIB  Hx  Postop, 6/20 [facility]- SR w/ PVCs  Plan Continued observation  *CVA  Hx: CVA 10/11: Residual right weakness, riser right foot  Sx: No further neuro concerns  Plan: Continued observation   Continue statin and aspirin and plavix  *COMPLIANCE  Status Compliant  Plan Discussed importance of compliance with meds/diet/salt/exercise; avoid tob/alc/drugs; patient verbalized understanding  *FOLLOWUP  3 months    Scribe Veda Pancho Gilma Oneill, am scribing for and in the presence of Karina Chávez MD.   Signed, Ifeanyi Pat 07/14/20 3:06 PM   Provider Janeth Red is working as a scribe for and in the presence of brii Chávez MD). Working as a scribe, Ifeanyi Pat may have prepopulated components of this note with my historical  intellectual property under my direct supervision. Any additions to this intellectual property were performed in my presence and at my direction. Furthermore, the content and accuracy of this note have been reviewed by brii Chávez MD).  7/16/2020 12:17 PM        If you have questions, please do not hesitate to call me. I look forward to following Milana Michaud along with you.     Sincerely,        Fantasma Velez MD

## 2020-07-31 NOTE — TELEPHONE ENCOUNTER
Wife states pt is having low pulse, he is lightheaded,  Weak all over and irregular HR . Pt's pulse 7/28/20 was 61, 7/29/20 61, 7/30/20 34 , 7/31 62. What can he do, please call to advise.

## 2020-07-31 NOTE — TELEPHONE ENCOUNTER
Spoke with patient's wife states these issues started a couple of days ago. Patient's wife states that he is weak and would like to know what he can do to get his pulse rate up. Denies chest pain, and that when his pulse is low he gets short of breath.

## 2020-08-05 NOTE — TELEPHONE ENCOUNTER
DCE would like patient to go to the ER. I spoke with the patient's wife and she did not think that the patient was needed to go to the ER. I also offered patient a NP appt but they declined. They said the patient has an echo next week and that they would wait until after it. I encouraged again the patient to go to the ER with any new issues. They expressed understanding and had no other questions.

## 2020-10-19 NOTE — PROGRESS NOTES
[facility]     Outpatient Follow Up Note    Lisa Anne is 80 y.o. 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 CHIEF COMPLAINT / HPI:  Follow Up secondary to HR & BP : carvedilol held in July after calling with c/o weakness, irregular and slow pulse  His home BP this morning was 143/93 (75)    He has since learned that he may have a supranuclear palsy based on CT although MRI was not conclusive. He has an appt with Dr. Marlena Nguyen on Dec 3rd. Subjective:   Everything was alright then, referring to his last OV here, and not now. Its no a coronary issues but neuro issues. His dizziness had worsened. He's off of his BP medicines altogether: imdur reduced and coreg stopped (7/31/20). He feels light headed not dizzy. He's had a couple of episodes of chest pain in the evening requiring NTG SL x1. He's only had 2 episodes over the past 3 months. He has no associated symptoms. There is LAL but c/o feeling weak. Its like his weakness feels like mini strokes. He looses his balance / equilibrium. The patient denies orthopnea/PND. The patient does not have swelling. The patients weight is stable The patient is not experiencing palpitations . These symptoms show no change since the last OV in regards to his cardiac status. With regard to medication therapy the patient has been compliant with prescribed regimen. They have not tolerated all med therapies to date.      Past Medical History:   Diagnosis Date    CAD (coronary artery disease) calcifications    2004 MI    Cancer ([address]     hands and face, left ear squamous cell    Chronic diastolic heart failure ([address]     Diabetes type 2, controlled [facility] [address] [facility]     Hyperlipidemia     Hypertension     Obesity (BMI 30-39. 9)     Unspecified cerebral artery occlusion with cerebral infarction     CVA 4-2001 no residual     Social History:    Social History     Tobacco Use   Smoking Status Never Smoker   Smokeless Tobacco Never Used     Current Medications:  Current Outpatient Medications   Medication Sig Dispense Refill    pravastatin (PRAVACHOL) 40 MG tablet Take 1 tablet by mouth daily 90 tablet 3    isosorbide mononitrate (IMDUR) 30 MG extended release tablet Take 1 tablet by mouth daily 90 tablet 3    furosemide (LASIX) 20 MG tablet TAKE 1 TABLET MONDAY, WED, AND FRIDAY 90 tablet 2    nitroGLYCERIN (NITROSTAT) 0.4 MG SL tablet Place 1 tablet under the tongue every 5 minutes as needed for Chest pain 25 tablet 3    Multiple Vitamins-Minerals (PRESERVISION AREDS 2) CAPS Take  by mouth 2 times daily.  loratadine (CLARITIN) 10 MG tablet Take 10 mg by mouth daily.  vitamin B-12 (CYANOCOBALAMIN) 1000 MCG tablet Take 1,000 mcg by mouth daily.  clopidogrel (PLAVIX) 75 MG tablet Take 75 mg by mouth daily.  insulin aspart protamine-insulin aspart (NOVOLOG 70/30) (70-30) 100 UNIT/ML injection Inject into the skin 2 times daily (with meals) 16 in the am ,16 qpm only with meals adjusts accordingly      acetaminophen (TYLENOL) 500 MG tablet Take 500 mg by mouth 2 times daily       aspirin 81 MG EC tablet Take 81 mg by mouth daily.  glipiZIDE (GLUCOTROL) 5 MG tablet Take 1 tablet by mouth 2 times daily (before meals). 60 tablet 6     No current facility-administered medications for this visit. REVIEW OF SYSTEMS:    CONSTITUTIONAL: No major weight gain or loss, fatigue, + weakness, - night sweats or fever. HEENT: No new vision difficulties or ringing in the ears. RESPIRATORY: No new SOB, PND, orthopnea or cough.    CARDIOVASCULAR: See HPI  GI: No nausea, vomiting, diarrhea, constipation, abdominal pain or changes in bowel habits. : No urinary frequency, urgency, incontinence hematuria or dysuria. SKIN: No cyanosis or skin lesions. MUSCULOSKELETAL: No new muscle or joint pain. NEUROLOGICAL: + light headedness. PSYCHIATRIC: No anxiety, pain, insomnia or depression    Objective:   PHYSICAL EXAM:    Vitals:    10/19/20 1111 10/19/20 1141   BP: (!) 108/56 (!) 154/70   Site: Right Upper Arm Right Upper Arm   Position: Sitting    Cuff Size: Large Adult Medium Adult   Pulse: 82    SpO2: 97%    Weight: 200 lb (90.7 kg)    Height: 5' 8\" (1.727 m)          VITALS:  BP (!) 108/56 (Site: Right Upper Arm, Position: Sitting, Cuff Size: Large Adult)   Pulse 82   Ht 5' 8\" (1.727 m)   Wt 200 lb (90.7 kg)   SpO2 97%   BMI 30.41 kg/m²   CONSTITUTIONAL: Cooperative, no apparent distress, and appears well nourished / developed  NEUROLOGIC:  Awake and orientated to person, place and time; [place]. PSYCH: Calm affect. SKIN: Warm and dry. HEENT: Sclera non-icteric, normocephalic, neck supple, no elevation of JVP, normal carotid pulses with no bruits and thyroid normal size. LUNGS:  No increased work of breathing and clear to auscultation, no crackles or wheezing  CARDIOVASCULAR:  Regular with ectopy rate 72 and rhythm with no murmurs, gallops, rubs, or abnormal heart sounds, normal PMI. The apical impulses not displaced  JVP less than 8 cm H2O  Heart tones are crisp and normal  Cervical veins are not engorged  The carotid upstroke is normal in amplitude and contour without delay or bruit  JVP is not elevated  ABDOMEN:  Normal bowel sounds, non-distended and non-tender to palpation  EXT: No edema, no calf tenderness. Pulses are present bilaterally.     DATA:      LABS: 9/2/20:   A1c 6.9%   Na+ 140 K+ 4.4 chl 103 CO2 26 BUN 15 creatinine 0.89 glu 191 GFR 75     6/2/20:    trig 151 HDL 38 LDL 97    Radiology Review:  Pertinent images / reports were reviewed as a part of this visit and reveals the following:    Myoview: Aug '16:  Summary     Small sized anteroseptal fixed defect consistent with infarction in the     territory of the proximal LAD .     Moderate-large sized inferolateral completely reversible defect consistent     with ischemia in the territory of the mid and distal LCx .     Moderate global hypokinesis with EF 40%         Stress Protocols      Event monitor: 6/18 - 7/4/20:   avg 67 bpm low 39 high 115  PVC 12% burden   PAC 2% burden    Last Echo: 8/13/20:  Summary   Suboptimal image quality. LV function difficult to assess secondary to poor   endocardial definition, wall motion abnormalities, and frequent PVCs. Overall left ventricular systolic function appears moderate to severely   reduced. Ejection fraction is visually estimated to be 30-40%. Abnormal   (paradoxical) septal motion is present likely due to left bundle branch   block or prior cardiac surgery. Cannot exclude regional wall motion   abnormalities secondary to poor endocardial visualization. Normal left   ventricular wall thickness and cavity size. Diastolic filling parameters   suggest grade II diastolic dysfunction. The right ventricle is not well visualized. The left atrium is moderately dilated. A bioprosthetic aortic valve appears well seated with a maximum velocity of   3 m/s and a mean gradient of 21 mmHg. Trivial mitral and tricuspid regurgitation. CT head: Sept '20  IMPRESSION  1. No CT evidence of acute intracranial hemorrhage, mass effect, or acute territorial ischemic infarct. 2.  Mild to moderate diffuse brain volume loss with more focal moderate volume loss in the midbrain. This configuration is suggestive of progressive supranuclear palsy but can also be seen with other neurodegenerative disorders. 3.  Mild chronic small vessel ischemic changes. If there is concern for acute ischemia, consider MRI for further evaluation. 4.  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     MRI brain : Sept '20:  IMPRESSION  Mild-to-moderate volume loss and mild microangiopathic degenerative white matter change. Small chronic lacunar infarcts within the ketty and pontomesencephalic junction. No intracranial mass, hemorrhage or acute infarction. Chronic pansinusitis. Assessment:      Diagnosis Orders   1. Coronary artery disease due to lipid rich plaque   ~stable : c/o 2 episodes of angina in 3 months  ~imdur dose decreased to 30 mg daily ; intolerant to Ranexa  ~LVF moderate to severely reduced. Ejection fraction is visually estimated to be 30-40% with echo from Aug '20  ~DAPT / statin   ~s/p CABG '11; occl SVG by cath '12  ~abnl myoview stress '16 EKG 12 lead   2. Essential hypertension   ~low on arrival ; reasonable and comparable to home BPs with recheck today  ~coreg held ; takes low dose lasix 20 mg 3x/wk    3. Nonrheumatic aortic valve stenosis   ~stable : well seated valve by echo  ~s/p AVR '11     4. Mixed hyperlipidemia   ~LDL not at goal < 70 ; A1c 6.9%  ~pravastatin 40 mg daily       I had the opportunity to review the clinical symptoms and presentation of Lisa Anne. Plan:     1. EKG: sinus rhythm, PVCs   ~resume BB using metoprolol succinate 12.5 mg once daily for PVC burden  2. F/U in three months    Overall the patient is stable from CV standpoint    I have addresed the patient's cardiac risk factors and adjusted pharmacologic treatment as needed. In addition, I have reinforced the need for patient directed risk factor modification. Further evaluation will be based upon the patient's clinical course and testing results. All questions and concerns were addressed to the patient/daughter. Alternatives to my treatment were discussed. The patient is not currently smoking. The risks related to smoking were reviewed with the patient. Recommend maintaining a smoke-free lifestyle.      Patient is no longer on a beta-blocker : coreg on hold  Patient is not on an ace-i/ARB : neg CHF; intolerant to lisinopril with cough and low BPs  Patient is on a statin    Dual Antiplatelet therapy has been recommended / prescribed for this patient. Education conducted on adverse reactions including bleeding was discussed. The patient verbalizes understanding not to stop medications without discussing with us. Discussed exercise: limited with light headedness and weakness   Discussed diet. SMBG 120    Thank you for allowing to us to participate in the care of Ammy Senior.     Mani Sender, APRN    Documentation of today's visit sent to PCP

## 2020-10-19 NOTE — PATIENT INSTRUCTIONS
Begin low dose metoprolol at 12.5 mg once a day (25 mg tablet at 1/2 tablet) will help with your \"PVCs\", premature heart beats    appt in three months

## 2020-10-28 NOTE — TELEPHONE ENCOUNTER
RX APPROVAL:      Refill:   Requested Prescriptions     Pending Prescriptions Disp Refills    nitroGLYCERIN (NITROSTAT) 0.4 MG SL tablet [Pharmacy Med Name: NITROGLYCERIN 0.4 MG TAB 0.4 TAB] 25 tablet 0     Sig: PLACE 1 TABLET UNDER THE TONGUE EVERY 5 MINUTES AS NEEDED FOR CHEST PAIN      Last OV: 10/19/2020   Last EKG:   Last Labs:   Lab Results   Component Value Date    GLUCOSE 82 02/22/2016    BUN 20 02/22/2016    CREATININE 0.9 02/22/2016    LABGLOM >60 02/22/2016     02/22/2016    K 4.4 02/22/2016     02/22/2016    CO2 23 02/22/2016    CALCIUM 9.5 02/22/2016     Lab Results   Component Value Date     02/22/2016     02/22/2016    CO2 23 02/22/2016    ANIONGAP 18 02/22/2016    GLUCOSE 82 02/22/2016    BUN 20 02/22/2016    CREATININE 0.9 02/22/2016    LABGLOM >60 02/22/2016    GFRAA >60 02/22/2016    GFRAA >60 02/17/2012    CALCIUM 9.5 02/22/2016    PROT 7.0 02/05/2016    PROT 3.5 06/20/2011    LABALBU 3.7 02/07/2016    BILITOT 0.7 02/05/2016    ALKPHOS 66 02/05/2016    AST 79 02/05/2016    ALT 17 02/05/2016    GLOB 2.9 02/05/2016     Lab Results   Component Value Date    ALT 17 02/05/2016    AST 79 02/05/2016     Lab Results   Component Value Date    K 4.4 02/22/2016       Plan and labs reviewed

## 2020-12-03 NOTE — TELEPHONE ENCOUNTER
Pt daughter Hawa Stone calling for DCE, Pt is in [employer] with COVID-19 they did an Echo on him there and DCE had him do one earlier this year. Maribel Allred would like to know if DCE could look at the [facility] one and compare it to the one done here and see if there is any difference and what that is?  Pls call to advise Thank you

## 2020-12-03 NOTE — TELEPHONE ENCOUNTER
Spoke to pts daughter to let her know that Dr Theresa Xavier reviewed pts echo done in [facility] AT [facility] showing decrease in LVF. Advised that once pt is recovered from [facility], we will repeat the echo with an OV with DCE.  Jose Martin MULLEN

## 2020-12-14 NOTE — TELEPHONE ENCOUNTER
Home care states pt's BP today was 95/68 pulse 89 and irregular. Asking what the patients metoprolol parameters are. Please call to advise.

## 2020-12-14 NOTE — TELEPHONE ENCOUNTER
Pt wife called stating pt just got out of the hospital wed 12/9 she stated his blood pressure is low 103/59, 99/54, 99/68,  They need to know  what they should do.  pls advise thank you

## 2020-12-14 NOTE — TELEPHONE ENCOUNTER
Last ov TS 10/19/20 dx cad, htn ,hld ,cad ,htn     Admitted to [facility] 11/28/20 AF Covid

## 2020-12-15 NOTE — TELEPHONE ENCOUNTER
Wife calling again to speak to DCE about this patients low b/p.wife needs to know what meds to give or hold to bring b/p up .

## 2020-12-15 NOTE — TELEPHONE ENCOUNTER
His home medications at discharge :   Metoprolol 12.5 mg bid  eliquis 5 mg bid  plavix 75 mg daily  zytrec daily  Tylenol  Lasix 20 mg M-W-F  pravastatin 20 mg daily    Insulin    Discontinued:  Glipizide  Isosorbide   Metoprolol succinate     His BP this morning was 138/85 (88)  yesterday it was 103/58 89/58 99/58 ; 1 pm 95/68 : he didn't look good so his am dose of metoprolol was held.  Later in the day his BP went up to 120/ ; last evening 110/66    Yesterday his stomach, feet, ankles and part of his leg was swollen > improved some after his dose of lasix  He is so weak; his sats go down when standing 84% 3 L NC    inst to hold metoprolol prn for a SBP < 104/   He is receiving phy therapy for strengthening   Recommended support stockings     He has an appt with his PCP on 12/18 ; they will keep us updated on how he's doing

## 2020-12-22 NOTE — TELEPHONE ENCOUNTER
Spoke with DCE, advised to avoid Nitro if SBP <100, however due to recent COVID infection, they should avoid over medicating with Nitro. Also reviewed previous advised regarding Metoprolol holding parameters per NPTS (see prev telephone encounter 12/15/20.) They verbalized understanding and know to proceed to ER if he has significant/severe CP.

## 2020-12-22 NOTE — TELEPHONE ENCOUNTER
If pt complains of chest pain asking if pt should be given nitro first or take bp first.     If his bp goes to low what action should be taken and how low is too low     Do they give metoprolol after nitro. Please call to clarify these questions.

## 2020-12-28 PROBLEM — I48.91 A-FIB (HCC): Status: ACTIVE | Noted: 2020-01-01

## 2020-12-28 NOTE — TELEPHONE ENCOUNTER
Received call from daughter and wife, Mariama Spangler started having chest pain/back pain this am, for which he took a Nitro. Following Nitro his BP was 84/50 (from 128/79). During hospitalization last month, he was found to have NSTEMI (managed medically. )They also report his heart rate has been \"yo yo-ing\" for the last several days, ranging from 50s-170s. HR was in the 170s within the last half hour. He was positive for COVID a few weeks ago and was in atrial fibrillation. He has not been able to sleep, though difficult to determine why. They reports O2 sats in the 90s. Advised he go to nearest ER for evaluation. Family verbalized understanding.

## 2020-12-29 NOTE — TELEPHONE ENCOUNTER
[facility]  received a phone call from Dr. Roxanne Mehta from [facility] regarding transferring a patient. Hattie Arreaga transferred call back. Dr. Roxanne Mehta reported that she was told to call our office to figure our the transfer of the patient. This RN told her that I would investigate and contact her back, she steve. This RN reached out to Dr. Pa Barber who has not received any phone calls regarding. This RN then contacted the transfer center and spoke with Maday Villalobos who reports that his has been facilitated and awaiting a bed at [facility]. She connected Dr. Roxanne Mehta with [facility] Hospitalist this morning ~0745. Maday Villalobos reports that Dr. Roxanne Mehta requested to speak with Dr. Emelia Kim specifially. This RN reached back out to Dr. Roxanne Mehta [phone] with an Nella Douse vu with no further q/c at this time.

## 2020-12-29 NOTE — TELEPHONE ENCOUNTER
Dr Fabiana Sawyer calling she saw pt yesterday and would like DCE to call her if he has any questions about pt. pls call to advise thank you

## 2020-12-29 NOTE — PROGRESS NOTES
Called and spoke with pts [facility] Pt came in with CP Afib with RVR was on cardizem gtt. Pt has trending trop levels. EKG faxed over. EKG obtained and given to Dr Viki Ro, updated Dr Sridhar Manrique. Phone for PAZ Treviño [phone].   Plan to get next open bed

## 2020-12-29 NOTE — PROGRESS NOTES
Patient to room [phone] from [facility]. Patient on Heparin drip 20,000U per 500ml @ 25ml/hr. Cardiology notified of patient's arrival and heparin orders requested. Dr. Selin Huynh notified of patient's arrival.  /60,  RR 28-32 with accessory muscle use.  02 95% on 5L, Desats to mid 80s with minimal movement. Patient states he was positive for COVID in early December and has been on 02 since.

## 2020-12-29 NOTE — CONSULTS
Cardiovascular Consultation     Attending Physician: Lida Joaquin MD    PATIENT: Erasmo Burt  : 1934  MRN: [id number]    Reason for Consultation:   [place]    History of present illness:   Mr. Erasmo Burt is a 80 y.o. male patient, follows with Dr. Joanna Simon for history of CAD and aortic stenosis, presented to outside hospital yesterday with chest pain and palpitations. Patient's wife and daughters called in twice in the past week. They reported he was increasing his use of nitro SL. He was hospitalized last month with COVID-19 pneumonia and discharged on home oxygen. He reports working with home therapy and until this past week having used sublingual nitroglycerin less than once a week. In attempting to characterize pain, he describes it as a pressure but is unable to give a consistent pattern. He states he is able to complete sessions of physical therapy without any discomfort. However he states that some episodes are during therapy. He states that some are with deep breaths. States that nitroglycerin would provide relief regardless of association. Reports chronic back pain complicating things. Denies fevers chills or cough. Denies lower extremity edema. Medical History:      Diagnosis Date    CAD (coronary artery disease) calcifications     MI    Cancer ([address]     hands and face, left ear squamous cell    Chronic diastolic heart failure ([address]     Diabetes type 2, controlled ([address]     Hyperlipidemia     Hypertension     Obesity (BMI 30-39. 9)     Unspecified cerebral artery occlusion with cerebral infarction     CVA 4- no residual       Surgical History:      Procedure Laterality Date    AORTIC VALVE SURGERY  2011    APPENDECTOMY          CARPAL TUNNEL RELEASE      bilateral    CHOLECYSTECTOMY      2000 lap    CORONARY ARTERY BYPASS GRAFT  2011    EYE SURGERY  10/21/15    Left eye retina surgery  EYE SURGERY  9/21/15    carlos cataracts    HERNIA REPAIR      inguinal, 3-2000    HIATAL HERNIA REPAIR      1992    JOINT REPLACEMENT      lt knee 2004    OTHER SURGICAL HISTORY      rt hip plate and screws [id number]    OTHER SURGICAL HISTORY  fall R leg shorter    rt hip plate and screws    SKIN CANCER EXCISION      located in ear- right        Social History:  Social History     Socioeconomic History    Marital status:      Spouse name: Not on file    Number of children: Not on file    Years of education: Not on file    Highest education level: Not on file   Occupational History    Not on file   Social Needs    Financial resource strain: Not on file    Food insecurity     Worry: Not on file     Inability: Not on file   [employer] needs     Medical: Not on file     Non-medical: Not on file   Tobacco Use    Smoking status: Never Smoker    Smokeless tobacco: Never Used   Substance and Sexual Activity    Alcohol use: No    Drug use: No    Sexual activity: Not Currently     Partners: Female   Lifestyle    Physical activity     Days per week: Not on file     Minutes per session: Not on file    Stress: Not on file   Relationships    Social connections     Talks on phone: Not on file     Gets together: Not on file     Attends Confucianist service: Not on file     Active member of club or organization: Not on file     Attends meetings of clubs or organizations: Not on file     Relationship status: Not on file    Intimate partner violence     Fear of current or ex partner: Not on file     Emotionally abused: Not on file     Physically abused: Not on file     Forced sexual activity: Not on file   Other Topics Concern    Not on file   Social History Narrative    Not on file        Family History:  No evidence for sudden cardiac death or premature CAD.       Problem Relation Age of Onset    Heart Disease Mother     Diabetes Mother     High Blood Pressure Mother     Cancer Father  Heart Disease Brother     High Blood Pressure Brother     High Cholesterol Brother     Cancer Brother     High Blood Pressure Sister     High Cholesterol Sister     Anesth Problems Neg Hx     Malig Hyperten Neg Hx     Hypotension Neg Hx     Malig Hypertherm Neg Hx     Pseudochol.  Deficiency Neg Hx        Medications:      heparin (porcine) injection 5,300 Units, Once      heparin (porcine) injection 5,300 Units, PRN      heparin (porcine) injection 2,650 Units, PRN      heparin 25,000 units in dextrose 5% 250 mL infusion, Continuous      perflutren lipid microspheres (DEFINITY) injection 1.65 mg, ONCE PRN      perflutren lipid microspheres (DEFINITY) injection 1.65 mg, ONCE PRN      atorvastatin (LIPITOR) tablet 40 mg, Nightly      aspirin chewable tablet 81 mg, Daily      isosorbide mononitrate (IMDUR) extended release tablet 30 mg, Daily      metoprolol tartrate (LOPRESSOR) tablet 25 mg, BID      ipratropium-albuterol (DUONEB) nebulizer solution 1 ampule, Q4H WA      insulin lispro (1 Unit Dial) 5 Units, TID WC      insulin glargine (LANTUS;BASAGLAR) injection pen 10 Units, BID      insulin lispro (1 Unit Dial) 0-18 Units, TID WC      insulin lispro (1 Unit Dial) 0-9 Units, Nightly      potassium chloride 10 mEq/100 mL IVPB (Peripheral Line), PRN      glucose (GLUTOSE) 40 % oral gel 15 g, PRN      dextrose 50 % IV solution, PRN      glucagon (rDNA) injection 1 mg, PRN      dextrose 5 % solution, PRN        Allergies:  Adhesive tape, Cortisone, and Percocet [oxycodone-acetaminophen]     Review of Systems:   [x]Full ROS obtained and negative except as mentioned in HPI    Physical Examination:    /60   Pulse 83   Resp 26   Ht 5' 8\" (1.727 m)   Wt 194 lb 10.7 oz (88.3 kg)   SpO2 94%   BMI 29.60 kg/m²   Wt Readings from Last 3 Encounters:   12/29/20 194 lb 10.7 oz (88.3 kg)   10/19/20 200 lb (90.7 kg)   07/16/20 200 lb (90.7 kg) abnormalities secondary to poor endocardial visualization. Normal left   ventricular wall thickness and cavity size. Diastolic filling parameters   suggest grade II diastolic dysfunction. The right ventricle is not well visualized. The left atrium is moderately dilated. A bioprosthetic aortic valve appears well seated with a maximum velocity of   3 m/s and a mean gradient of 21 mmHg. Trivial mitral and tricuspid regurgitation. 12/2020  Summary:  Overall left ventricular ejection fraction is estimated to be 20-25%. The left ventricular function is severely reduced. Regional wall motion abnormalities cannot be excluded due to limited  visualization. The right ventricular systolic function is severely reduced. The prosthetic aortic valve is not well visualized. The systolic pulmonary artery pressure cannot be estimated due to insufficient  tricuspid regurgitation. There is no pericardial effusion. The IVC size is dilated. The study was technically limited. STRESS TEST:   2016   Summary    Small sized anteroseptal fixed defect consistent with infarction in the    territory of the proximal LAD .    Moderate-large sized inferolateral completely reversible defect consistent    with ischemia in the territory of the mid and distal LCx .    Moderate global hypokinesis with EF 40%     COVID19 PCR negative 12/28/20      Impression/Recommendations    Mr. Adama Forte is a 80 y.o. male patient of Dr. Jean Baptiste Page:     Paroxysmal atrial fibrillation, with RVR on arrival  CAD: Hx. CABG (LIMA patent, SVG-OM & SVG-LPDA occluded by 2016 angiography)   Hx. SAVR (2011)   Chronic systolic CHF (LVEF 77-08% by December 2020 TTE), compensated   Hypertension  Hyperlipidemia  Diabetes mellitus   Hx. CVA  Gait dysfunction         Atypical chest pains since November 2020 COVID19 Pneumonia (11/24/20) admission. Patient offers a pleuritic component but continued to utilize SL Nitro for any referred pains. NSTEMI at that time with further reduction in LVEF managed medically. Patient has historically declined LHC. Will obtain serial enzymes and limited TTE here. Mr. Marisa Vásquez has not been on anticoagulation for history of PAF. June 2020 event monitor without AFib recurrence. Will start with rate control strategy and anticoagulation. Further recs pending clinical course. Thank you for allowing me to participate in the care of your patient. Please do not hesitate to call. Vika Sheikh DO, [facility]  Interventional Cardiology     o: [phone]  [address] [place], [place] [address]olia [facility] [address], [address]      NOTE:  This report was transcribed using voice recognition software. Every effort was made to ensure accuracy; however, inadvertent computerized transcription errors may be present.

## 2020-12-29 NOTE — H&P
HOSPITALISTS HISTORY AND PHYSICAL    12/29/2020 4:04 PM    Patient Information:  Alejandro Mckinney is a 80 y.o. male [id number]  PCP:  Dudley Hebert (Tel: None )    Chief complaint:  No chief complaint on file. Atypical chest pain    History of Present Illness:  Bernie Hahtaway is a 80 y.o. male who presented with atypical chest pain, A. fib with RVR, he was seen at [facility] and he was transferred to [facility] for management A. fib with RVR, NSTEMI, hyperglycemia. At outside facility he was started on Cardizem gtt., heparin drip. His lab was significant for hemoglobin 10, WBC 10, creatinine 1.8, blood glucose 355, anion gap of 15, hyperglycemia, total bili 1.2, mag 1.9, his troponin was trending up on outside facility most recent value was 0.8, proBNP 9000 prior to transfer. Most recent echo shows EF 35% with A. fib. He was transferred per cardiology for further recommendation patient was tested positive last month for COVID-19. REVIEW OF SYSTEMS:   Constitutional: Negative for fever,chills or night sweats  ENT: Negative for rhinorrhea, epistaxis, hoarseness, sore throat. Respiratory: Positive for shortness of breath,wheezing  Cardiovascular: Negative for chest pain, palpitations   Gastrointestinal: Negative for nausea, vomiting, diarrhea  Genitourinary: Negative for polyuria, dysuria   Hematologic/Lymphatic: Negative for bleeding tendency, easy bruising  Musculoskeletal: Negative for myalgias and arthralgias  Neurologic: Negative for confusion,dysarthria. Skin: Negative for itching,rash  Psychiatric: Negative for depression,anxiety, agitation. Endocrine: Negative for polydipsia,polyuria,heat /cold intolerance.     Past Medical History: has a past medical history of CAD (coronary artery disease), Cancer ([facility] Chronic diastolic heart failure ([facility] Diabetes type 2, controlled ([facility] Hyperlipidemia, Hypertension, Obesity (BMI 30-39.9), and Unspecified cerebral artery occlusion with cerebral infarction. Past Surgical History:   has a past surgical history that includes hiatal hernia repair; other surgical history; Cholecystectomy; Carpal tunnel release; other surgical history (fall R leg shorter); hernia repair; joint replacement; Appendectomy; Coronary artery bypass graft (6/2011); Aortic valve surgery (6/2011); Eye surgery (10/21/15); eye surgery (9/21/15); and Skin cancer excision. Medications:  No current facility-administered medications on file prior to encounter. Current Outpatient Medications on File Prior to Encounter   Medication Sig Dispense Refill    nitroGLYCERIN (NITROSTAT) 0.4 MG SL tablet PLACE 1 TABLET UNDER THE TONGUE EVERY 5 MINUTES AS NEEDED FOR CHEST PAIN 25 tablet 1    cetirizine (ZYRTEC) 10 MG tablet Take 10 mg by mouth daily      metoprolol succinate (TOPROL XL) 25 MG extended release tablet Take 1 tablet by mouth daily 30 tablet 3    pravastatin (PRAVACHOL) 40 MG tablet Take 1 tablet by mouth daily 90 tablet 3    isosorbide mononitrate (IMDUR) 30 MG extended release tablet Take 1 tablet by mouth daily 90 tablet 3    furosemide (LASIX) 20 MG tablet TAKE 1 TABLET MONDAY, WED, AND FRIDAY 90 tablet 2    Multiple Vitamins-Minerals (PRESERVISION AREDS 2) CAPS Take  by mouth 2 times daily.  vitamin B-12 (CYANOCOBALAMIN) 1000 MCG tablet Take 1,000 mcg by mouth daily.  clopidogrel (PLAVIX) 75 MG tablet Take 75 mg by mouth daily.         insulin aspart protamine-insulin aspart (NOVOLOG 70/30) (70-30) 100 UNIT/ML injection Inject into the skin 2 times daily (with meals) 16 in the am ,16 qpm only with meals adjusts accordingly  acetaminophen (TYLENOL) 500 MG tablet Take 500 mg by mouth 2 times daily       aspirin 81 MG EC tablet Take 81 mg by mouth daily.  glipiZIDE (GLUCOTROL) 5 MG tablet Take 1 tablet by mouth 2 times daily (before meals). 60 tablet 6       Allergies: Allergies   Allergen Reactions    Adhesive Tape      Paper tape is ok    Cortisone      Chest pains; high B/P    Percocet [Oxycodone-Acetaminophen] Rash     hives        Social History:  Patient Lives    reports that he has never smoked. He has never used smokeless tobacco. He reports that he does not drink alcohol or use drugs. Family History:  family history includes Cancer in his brother and father; Diabetes in his mother; Heart Disease in his brother and mother; High Blood Pressure in his brother, mother, and sister; High Cholesterol in his brother and sister. ,     Physical Exam:  /60   Pulse 83   Resp 26   Ht 5' 8\" (1.727 m)   Wt 194 lb 10.7 oz (88.3 kg)   SpO2 94%   BMI 29.60 kg/m²     General appearance:  Appears comfortable. Well nourished  Eyes: Sclera clear, pupils equal  ENT: Moist mucus membranes, no thrush. Trachea midline. Cardiovascular: Regular rhythm, normal S1, S2. No murmur, gallop, rub. No edema in lower extremities  Respiratory: Clear to auscultation bilaterally, no wheeze, good inspiratory effort  Gastrointestinal: Abdomen soft, non-tender, not distended, normal bowel sounds  Musculoskeletal: No cyanosis in digits, neck supple  Neurology: Cranial nerves grossly intact. Alert and oriented in time, place and person. No speech or motor deficits  Psychiatry: Appropriate affect.  Not agitated  Skin: Warm, dry, normal turgor, no rash  Brisk capillary refill, peripheral pulses palpable   Labs:  CBC:   Lab Results   Component Value Date    WBC 8.4 12/29/2020    RBC 3.65 12/29/2020    HGB 10.7 12/29/2020    HCT 33.4 12/29/2020    MCV 91.6 12/29/2020    MCH 29.4 12/29/2020    MCHC 32.1 12/29/2020    RDW 18.2 12/29/2020

## 2020-12-30 NOTE — PROGRESS NOTES
Assessment completed. Pt is extremely hard of hearing, respirations even/unlabored with dyspnea on exertion noted on 5L O2, heparin gtt infusing, no c/o SOB/CP. Family updated on plan of care, per wife,  has also been following neurologist at [facility] for increased dizziness and lightheadedness but was not followed up with outpatient d/t Covid restrictions, and would like to continue workup here. Updated on plan of care. Pt in NSR until this 0400 this morning, flipped back into A. Fib, HR controlled 90s-100s. Vitals are stable.    Vitals:    12/30/20 0441   BP: 112/77   Pulse: 101   Resp: 20   Temp: 99.1 °F (37.3 °C)   SpO2: 92%

## 2020-12-30 NOTE — PROGRESS NOTES
[facility]  Daily Progress Note    Cardio: Leydi Olivo: 12/29/2020      CC: AS, CAD, HTN, CHOL, AFIB, CVA  Subj: Today, long discussion with patient and family today. Patient DID NOT know date, year, president, current location, my name. Discussed procedures again and came to conclusion that he would like to avoid if at all possible.       Obj:  /79   Pulse 100   Temp 99.1 °F (37.3 °C) (Axillary)   Resp 20   Ht 5' 8\" (1.727 m)   Wt 192 lb 3.2 oz (87.2 kg)   SpO2 94%   BMI 29.22 kg/m²   No intake or output data in the 24 hours ending 12/30/20 1255  Gen Alert, coop, no distress Heart irreg   Head NC, AT, no abnorm Abd Soft, NT, ND, +BS, no mass, no OM   Eyes PERRLA, conj/corn clear Ext Ext nl, AT, no c/c/e   Nose Nares nl, no drain, NT Pulse 2+ symm ra, dp, pt   Throat Lips, mucosa, tongue nl Skin Color/tect/turg nl, no rash/lesion   Neck S/S, TM, NT, no bruit/JVD Psych Nl mood and affect   Lung decr bilat Lymph No cervical or ax LA   Ch Wall NT, no deform Neuro Nl gross M/S exam     Medications:    heparin (porcine)  60 Units/kg Intravenous Once    atorvastatin  40 mg Oral Nightly    aspirin  81 mg Oral Daily    isosorbide mononitrate  30 mg Oral Daily    ipratropium-albuterol  1 ampule Inhalation Q4H WA    insulin lispro  5 Units Subcutaneous TID     insulin lispro  0-18 Units Subcutaneous TID     insulin lispro  0-9 Units Subcutaneous Nightly    clopidogrel  75 mg Oral Daily    metoprolol succinate  50 mg Oral Nightly    lisinopril  2.5 mg Oral Daily    spironolactone  12.5 mg Oral Daily    insulin glargine  10 Units Subcutaneous BID      heparin (PORCINE) Infusion 12 Units/kg/hr (12/30/20 0317)    dextrose       Lab Data: Relevant and available CV data reviewed    Plan:  *AS    Date EF Detail   Sx     Sob, dizziness   Hx 6/11   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 ([place])   TTE 10/14  8/16  10/18  8/20  12/20 55%  55%  40%  40%  35% Bio AV MG 15  Bio AV MG 12  Bio AV MG 17 Bio AV MG 21   Plan     Continued observation and interval echos  Not candidate for reintervention   *CAD    Date EF Results   Sx     Sob, dizziness   NYH     II   Hx 6/11   CABGx3 LIMA-LAD, SVG-OM2, SVG-PDA Brendan Dago)   [facility] 5/11 2/12 2/16   MVD->CABG (Yasmin)  SVG grafts occluded, LIMA patent ([place])  LOZA to LAD patent, medical management Phoebe Mis)   Plan     Stable and noncrescendo troponin  Very poor candidate for intervention given dementia, age, anemia  Consistent with patient wishes, recommend medical management of CAD   *HTN  Status Controlled  Plan Counseled on diet/salt/exercise/weight, continue meds at doses above  *AFIB  Hx          Postop, 6/20 [facility]- SR w/ PVCs, recurrence this admission  Plan [facility] indicated, would stop plavix if start [facility]   Per family, patient wheelchair bound, difficult transitions and fall risk   Caution with anticoagulation  *CVA  Hx:         CVA 10/11:          Residual right weakness, riser right foot  Status: CVA 10/11, resid right weakness  Plan: Continued observation, asa, plavix, statin

## 2020-12-31 NOTE — CARE COORDINATION
SW spoke with [facility].   [facility] will be meeting with patient's family this evening at 5:30 pm.    [facility]  [phone] (fax)    Electronically signed by CAITLYN Marques on 12/31/2020 at 4:40 PM

## 2020-12-31 NOTE — PROGRESS NOTES
[facility] PROGRESS NOTE    12/31/2020 9:36 AM        Name: Nikkie Cortes . Admitted: 12/29/2020  Primary Care Provider: Kristen Arechiga (Tel: None)                        Subjective:  . No acute events overnight. Resting well. Pain control. Diet ok. Labs reviewed  Denies any chest pain sob.      Reviewed interval ancillary notes    Current Medications      acetaminophen (TYLENOL) tablet 1,000 mg, Q6H PRN      lidocaine 4 % external patch 1 patch, Daily      methocarbamol (ROBAXIN) tablet 1,000 mg, Q6H PRN      heparin (porcine) injection 5,300 Units, Once      heparin (porcine) injection 5,300 Units, PRN      heparin (porcine) injection 2,650 Units, PRN      heparin 25,000 units in dextrose 5% 250 mL infusion, Continuous      perflutren lipid microspheres (DEFINITY) injection 1.65 mg, ONCE PRN      perflutren lipid microspheres (DEFINITY) injection 1.65 mg, ONCE PRN      atorvastatin (LIPITOR) tablet 40 mg, Nightly      aspirin chewable tablet 81 mg, Daily      isosorbide mononitrate (IMDUR) extended release tablet 30 mg, Daily      ipratropium-albuterol (DUONEB) nebulizer solution 1 ampule, Q4H WA      insulin lispro (1 Unit Dial) 5 Units, TID WC      insulin lispro (1 Unit Dial) 0-18 Units, TID WC      insulin lispro (1 Unit Dial) 0-9 Units, Nightly      potassium chloride 10 mEq/100 mL IVPB (Peripheral Line), PRN      glucose (GLUTOSE) 40 % oral gel 15 g, PRN      dextrose 50 % IV solution, PRN      glucagon (rDNA) injection 1 mg, PRN      dextrose 5 % solution, PRN      clopidogrel (PLAVIX) tablet 75 mg, Daily      metoprolol succinate (TOPROL XL) extended release tablet 50 mg, Nightly      lisinopril (PRINIVIL;ZESTRIL) tablet 2.5 mg, Daily      spironolactone (ALDACTONE) tablet 12.5 mg, Daily   insulin glargine (LANTUS;BASAGLAR) injection pen 10 Units, BID        Objective:  /65   Pulse 96   Temp 97.6 °F (36.4 °C) (Temporal)   Resp 18   Ht 5' 8\" (1.727 m)   Wt 192 lb 3.2 oz (87.2 kg)   SpO2 98%   BMI 29.22 kg/m²     Intake/Output Summary (Last 24 hours) at 12/31/2020 0936  Last data filed at 12/31/2020 0913  Gross per 24 hour   Intake    Output 350 ml   Net -350 ml      Wt Readings from Last 3 Encounters:   12/30/20 192 lb 3.2 oz (87.2 kg)   10/19/20 200 lb (90.7 kg)   07/16/20 200 lb (90.7 kg)       General appearance:  Appears comfortable  Eyes: Sclera clear. Pupils equal.  ENT: Moist oral mucosa. Trachea midline, no adenopathy. Cardiovascular: Regular rhythm, normal S1, S2. No murmur. No edema in lower extremities  Respiratory: Not using accessory muscles. Good inspiratory effort. Clear to auscultation bilaterally, no wheeze or crackles. GI: Abdomen soft, no tenderness, not distended, normal bowel sounds  Musculoskeletal: No cyanosis in digits, neck supple  Neurology: CN 2-12 grossly intact. No speech or motor deficits  Psych: Normal affect. Alert and oriented in time, place and person  Skin: Warm, dry, normal turgor    Labs and Tests:  CBC:   Recent Labs     12/29/20  1351 12/30/20  0754 12/31/20  0538   WBC 8.4 9.0 7.0   HGB 10.7* 9.8* 8.4*    440 410     BMP:    Recent Labs     12/29/20  1425 12/30/20  0754 12/31/20  0539   * 137 137   K 5.4* 3.8 4.8   CL 95* 101 103   CO2 21 25 24   BUN 31* 42* 51*   CREATININE 1.2 0.8 0.9   GLUCOSE 522* 116* 228*     Hepatic: No results for input(s): AST, ALT, ALB, BILITOT, ALKPHOS in the last 72 hours.     Discussed care with family and patient             Spent 30  minutes with patient and family at bedside and on unit reviewing medical records and labs, spent greater than 50% time counseling patient and family on diagnosis and plan   Problem List  Active Problems:    NSTEM[facility] (non-ST elevated [facility] infarction) [facility]

## 2020-12-31 NOTE — PROGRESS NOTES
[facility]   Progress Note  Cardiology    CC: chest pain, AF  HPI:   After applying his hearing aids he assures me that he feels fine and wants to go home today \"by suppertime\". He is in AF and his rates are generally well controlled on the metoprolol. There was a NSVT episode yesterday of 9 beats and was asymptomatic. He is not interested in any invasive evaluation of his chest pain by cath. I discussed AC for his AF and he states he does not fall and would be willing to be on this. Medications/Labs all Reviewed    Recent Labs     12/31/20  0538   WBC 7.0   HGB 8.4*   HCT 26.1*   MCV 89.4        Recent Labs     12/31/20  0539   CREATININE 0.9   BUN 51*      K 4.8      CO2 24     No results for input(s): INR, PROTIME in the last 72 hours.      MEDICATIONS:    lidocaine  1 patch Transdermal Daily    heparin (porcine)  60 Units/kg Intravenous Once    atorvastatin  40 mg Oral Nightly    aspirin  81 mg Oral Daily    isosorbide mononitrate  30 mg Oral Daily    ipratropium-albuterol  1 ampule Inhalation Q4H WA    insulin lispro  5 Units Subcutaneous TID WC    insulin lispro  0-18 Units Subcutaneous TID WC    insulin lispro  0-9 Units Subcutaneous Nightly    clopidogrel  75 mg Oral Daily    metoprolol succinate  50 mg Oral Nightly    lisinopril  2.5 mg Oral Daily    spironolactone  12.5 mg Oral Daily    insulin glargine  10 Units Subcutaneous BID      heparin (PORCINE) Infusion 14.043 Units/kg/hr (12/30/20 2344)    dextrose         Physical Examination:    /65   Pulse 96   Temp 97.6 °F (36.4 °C) (Temporal)   Resp 18   Ht 5' 8\" (1.727 m)   Wt 192 lb 3.2 oz (87.2 kg)   SpO2 98%   BMI 29.22 kg/m²     Respiratory:  · Resp Assessment: Normal respiratory effort  · Resp Auscultation: Clear to auscultation bilaterally   Cardiovascular:  · Auscultation: irregular rate and rhythm, normal S1S2, no murmur, rub or gallop  · Palpation:  Nl PMI  · JVP:  normal · Extremities: No Edema  Abdomen:  · Soft, non-tender  · Normal bowel sounds  Extremities:  ·  No Cyanosis or Clubbing  Neurological/Psychiatric:  · Oriented to time, place, and person  · Non-anxious  Skin Warm and dry    Assessment:    Active Problems:    NSTEMI (non-ST elevated myocardial infarction) [facility])- will continue medical therapy without angiography. He is known to have CAD with some failed SVGs. AF - rate mainly controlled with metoprolol (when doses are not held). He has been on plavix asa and has an anemia which is normochromic normocytic. He has had a previous CVA and has a very high CHADS score  I think it is reasonable to place him on low dose eliquis but he will need close monitoring of his H/H as an OP. I know that he has a bioprosthetic valve but he is a poor candidate for coumadin due to its higher propensity for bleeding.               Carlie Steiner MD, 12/31/2020 9:51 AM

## 2020-12-31 NOTE — DISCHARGE INSTR - COC
Continuity of Care Form    Patient Name: Jimy Ruiz   :  1934  MRN:  [id number]    Admit date:  2020  Discharge date:  ***    Code Status Order: Prior   Advance Directives:   [place] Directive Type of Healthcare Directive Copy in [address] Agent's Name Healthcare Agent's Phone Number    20 1834  No, patient does not have an advance directive for healthcare treatment -- -- -- -- --            Admitting Physician:  Asia Andrea MD  PCP: Otis Wharton    Discharging Nurse: [facility] Unit/Room#: [id number]-1276/9515-90  Discharging Unit Phone Number: ***    Emergency Contact:   Extended Emergency Contact Information  Primary Emergency Contact: Harmony Hoover  Address: [facility] [address], [place] [address] [place] [address] Phone: [phone]  Relation: Spouse  Secondary Emergency Contact: Althea Vasquez  Address: [place] [address] [address] Phone: [phone]  Relation: Child    Past Surgical History:  Past Surgical History:   Procedure Laterality Date    AORTIC VALVE SURGERY  2011    APPENDECTOMY      1994    CARPAL TUNNEL RELEASE      bilateral    CHOLECYSTECTOMY      2000 lap    CORONARY ARTERY BYPASS GRAFT  2011    EYE SURGERY  10/21/15    Left eye retina surgery    EYE SURGERY  9/21/15    carlos cataracts    HERNIA REPAIR      inguinal, 3-    HIATAL HERNIA REPAIR      1992    JOINT REPLACEMENT      lt knee 2004    OTHER SURGICAL HISTORY      rt hip plate and screws     OTHER SURGICAL HISTORY  fall R leg shorter    rt hip plate and screws    SKIN CANCER EXCISION      located in ear- right        Immunization History:   Immunization History   Administered Date(s) Administered    Influenza Whole 10/17/2010    Pneumococcal Conjugate 7-valent (Reather Meckel) 2006 Active Problems:  Patient Active Problem List   Diagnosis Code    AS (aortic stenosis) I35.0    CAD (coronary artery disease) I25.10    Myopathy G72.9    Dizzy R42    C[facility] I63.9    Hypertension I10    Hypercholesteremia E78.00    PAF (paroxysmal atrial fibrillation) ([facility]) I48.0    NSTEMI (non-ST elevated myocardial infarction) ([facility] I21.4    Chronic diastolic heart failure ([facility]) I50.32    Obesity (BMI 30-39. 9) E66.9    Diabetes type 2, controlled ([facility] E11.9    A-fib ([facility] I48.91       Isolation/Infection:   Isolation            No Isolation          Unreconciled Outside Infections       Enable clinical decision support by reconciling outside information with the patient's chart. .      Infection Onset Last Indicated Last Received Source    No mapped external infections found      .     Unmapped Infections        COVID-19 Confirmed 11/25/20 11/25/20 12/03/20 [facility]                   Patient Infection Status       None to display            Nurse Assessment:  Last Vital Signs: /65   Pulse 96   Temp 97.6 °F (36.4 °C) (Temporal)   Resp 18   Ht 5' 8\" (1.727 m)   Wt 192 lb 3.2 oz (87.2 kg)   SpO2 98%   BMI 29.22 kg/m²     Last documented pain score (0-10 scale): Pain Level: 10  Last Weight:   Wt Readings from Last 1 Encounters:   12/30/20 192 lb 3.2 oz (87.2 kg)     Mental Status:  {IP PT MENTAL STATUS:20030:::0}    IV Access:  { BAYRON IV ACCESS:237051448:::0}    Nursing Mobility/ADLs:  Walking   {P DME ADLs:823199929:::0}  Transfer  {P DME ADLs:852319564:::0}  Bathing  {P DME ADLs:234919473:::0}  Dressing  {P DME ADLs:144956407:::0}  Toileting  {P DME ADLs:684203510:::0}  Feeding  {P DME ADLs:830748899:::0}  Med Admin  {P DME ADLs:517436056:::0}  Med Delivery   {MH BAYRON MED Delivery:987478459:::0}    Wound Care Documentation and Therapy:  Incision 06/20/11 Sternum Mid (Active)   Number of days: 2089       Incision 06/20/11 Leg Left (Active) Number of days: 3481       Incision 11 Back Right (Active)   Number of days: 3474       Incision 11 Neck Right (Active)   Number of days: 3470        Elimination:  Continence:   · Bowel: {YES / JV:86161}  · Bladder: {YES / LK:05480}  Urinary Catheter: {Urinary Catheter:521204484:::0}   Colostomy/Ileostomy/Ileal Conduit: {YES / VN:74042}       Date of Last BM: ***    Intake/Output Summary (Last 24 hours) at 2020 1016  Last data filed at 2020 0913  Gross per 24 hour   Intake    Output 350 ml   Net -350 ml     No intake/output data recorded.     Safety Concerns:     [employer] Safety Concerns:336448978:::0}    Impairments/Disabilities:      [employer] Impairments/Disabilities:071707946:::0}    Nutrition Therapy:  Current Nutrition Therapy:   [employer] Diet List:929092202:::0}    Routes of Feeding: {CHP DME Other Feedings:811171436:::0}  Liquids: {Slp liquid thickness:07447}  Daily Fluid Restriction: {CHP DME Yes amt example:696539428:::0}  Last Modified Barium Swallow with Video (Video Swallowing Test): {Done Not Done DOJ:::7}    Treatments at the Time of Hospital Discharge:   Respiratory Treatments: ***  Oxygen Therapy:  {Therapy; copd oxygen:94722:::0}  Ventilator:    {[facility] Vent List:205600474:::0}    Rehab Therapies: {THERAPEUTIC INTERVENTION:2966181366}  Weight Bearing Status/Restrictions: [employer] Weight Bearin:::0}  Other Medical Equipment (for information only, NOT a DME order):  {EQUIPMENT:291550456}  Other Treatments: ***    Patient's personal belongings (please select all that are sent with patient):  {CHP DME Belongings:870807685:::0}    RN SIGNATURE:  {Esignature:342163616:::0}    CASE MANAGEMENT/SOCIAL WORK SECTION    Inpatient Status Date: 2020    Readmission Risk Assessment Score:  Readmission Risk              Risk of Unplanned Readmission:        13           Discharging to Facility/ Agency   · Name: [facility]  · Address:  · Phone:[phone]

## 2020-12-31 NOTE — CONSULTS
Palliative Care:      a 80 y.o. male who presented with atypical chest pain, A. fib with RVR, he was seen at [facility] and he was transferred to [facility] for management A. fib with RVR, NSTEMI, hyperglycemia. At outside facility he was started on Cardizem gtt., heparin drip. His lab was significant for hemoglobin 10, WBC 10, creatinine 1.8, blood glucose 355, anion gap of 15, hyperglycemia, total bili 1.2, mag 1.9, his troponin was trending up on outside facility most recent value was 0.8, proBNP 9000 prior to transfer. Most recent echo shows EF 35% with A. fib. He was transferred per cardiology for further recommendation patient was tested positive last month for COVID-19. Admitted for symptom management on 12/29/20 and Palliative consult ordered 12/31/20. Labs and Tests:  CBC:         Recent Labs     12/29/20  1351 12/30/20  0754 12/31/20  0538   WBC 8.4 9.0 7.0   HGB 10.7* 9.8* 8.4*    440 410      BMP:          Recent Labs     12/29/20  1425 12/30/20  0754 12/31/20  0539   * 137 137   K 5.4* 3.8 4.8   CL 95* 101 103   CO2 21 25 24   BUN 31* 42* 51*   CREATININE 1.2 0.8 0.9   GLUCOSE 522* 116* 228*     Past Medical History:   has a past medical history of CAD (coronary artery disease), [facility] ([facility] Chronic diastolic heart failure ([facility] Diabetes type 2, controlled ([facility] Hyperlipidemia, Hypertension, Obesity (BMI 30-39.9), and Unspecified cerebral artery occlusion with cerebral infarction. Past Surgical History:   has a past surgical history that includes hiatal hernia repair; other surgical history; Cholecystectomy; Carpal tunnel release; other surgical history (fall R leg shorter); hernia repair; joint replacement; Appendectomy; Coronary artery bypass graft (6/2011); Aortic valve surgery (6/2011); Eye surgery (10/21/15); eye surgery (9/21/15); and Skin cancer excision.     Advance Directives:     DNR-CC as of 12/31/20 Problem Severity: Pain/Other Symptoms:  Has residual right weakness due to hx. CVA 2011. IS Native. Requiring 02 @ 5L via NC (sats at 98%). Recent VSS 1300 27.9-29-/80. Bed Mobility/Toileting/Transfer:  Patient is WC bound. Difficult with transitions and high fall risk. Performance Status:        Palliative Performance Scale:  100% []Full Normal activity & work No evidence of disease  90%   [] Full Normal activity & work Some evidence of disease  80%   [] Full Normal activity with Effort Some evidence of disease  70%   [] Reduced Unable Normal Job/Work Significant disease Full Normal or reduced  60%   [] Ambulation reduced; Significant disease; Can't do hobbies/housework; intake normal   or reduced; occasional assist; LOC full/confusion  50%   [] Mainly sit/lie; Extensive disease; Can't do any work; Considerable assist; intake normal  Or reduced; LOC full/confusion  40%   [] Mainly in bed; Extensive disease; Mainly assist; intake normal or reduced; occasional assist; LOC full/confusion  30%   [x] Bed Bound; Extensive disease; Total care; intake reduced; LOC full/confusion  20%   [] Bed Bound; Extensive disease; Total care; intake minimal; Drowsy/coma  10%   [] Bed Bound; Extensive disease; Total care; Mouth care only; Drowsy/coma    PPS 30%    Symptom Assessment: Appetite/Nausea/Bowels/Fatigue:      lbs. BMI 3.2  LBM prior to admit on 12/29/20. Albumin  3.7          EF @ 30%         Appetite poor     Wt Readings from Last 3 Encounters:   12/29/20 194 lb 10.7 oz (88.3 kg)   10/19/20 200 lb (90.7 kg)   07/16/20 200 lb (90.7 kg)     Social History:   reports that he has never smoked. He has never used smokeless tobacco. He reports that he does not drink alcohol or use drugs.     Family History: family history includes Cancer in his brother and father; Diabetes in his mother; Heart Disease in his brother and mother; High Blood Pressure in his brother, mother, and sister; High Cholesterol in his brother and sister. Psychological/Spiritual:    . Two children. [employer]       Family Discussion:      Lives in own home. Wants to return home in [place]. Wife emotional with patient's physical decline. Patient follows Neurologist as out patient [facility] due to increased dizziness/lightheadedness. Patient A/O X3 with today's assessment. Reviewed events prior to admit and current physical status with patient, his wife Cindy Jaimes and one daughter Guera Roth. Discussed ACP/Code status. Education provided on all variables. Patient agrees with DNR-CC. Discussed [employer]. Patient has strong Quaker fidelia. Agreeable for  to visit for spiritual prayer. Will notify. Discussed hospice philosophy and interdisciplinary approach to maintain comfort in own home and support for family. Patient does not want to continue with therapies and wants to be comfortable in own home. Agrees for hospice services. Hospice selected per family is [facility] [phone], [phone]). Intake information faxed to hospice to schedule admission with family in their home as of [phone] today and work with CM for discharge planning. Nurse Demi Reddy on floor and CM Ruiz Diaz notified of today's discussions. Perfect Serve to physician for [facility], hospice consult, and  consult submitted. Will continue to meet with patient and family as needed for education/emotional support.

## 2020-12-31 NOTE — PROGRESS NOTES
Vitals:    12/31/20 0447   BP: 108/67   Pulse: 98   Resp: 20   Temp: 97.8 °F (36.6 °C)   SpO2: 95%   vss, toradol , robaxin given for back pain with effectiveness, continues to be in afib , Hr decreased from 130s in the beginning of shift to  90s to low 100s  throughtout shift after given metropolol 2100  , heparin  Therapeutic x1 , plan of care continue

## 2020-12-31 NOTE — CARE COORDINATION
Discharge Planning Assessment     discharge planner spoke with patient's daughter on yesterday 2020 to discuss reason for admission, current living situation, and potential needs at the time of discharge    Demographics/Insurance verified Yes/No: Yes/Medicare    Current type of dwellin level home with ramp entry. Patient from ECF/ confirmed with:N/A    Living arrangements: Patient lives with spouse and daughter. Patient's family very supportive    Level of function/Support:Needs support with ADLs and IADLs. Family very supportive. PCP:Kareem Cintron    Last Visit to PCP: 2020    DME: [facility], Hospital Bed, shower Chair, BSC,grab bars    Active with any community resources/agencies/skilled home care: Patient is active with [facility] [place] [phone]. Medication compliance issues: Family assist.    Financial issues that could impact healthcare:None    Tentative discharge plan:  Patient will discharge to home with [facility]. Discussed and provided facilities of choice if transition to a skilled nursing facility is required at the time of discharge. N/A. Family does not want SNF. Discussed with patient and/or family that on the day of discharge home tentative time of discharge will be between 10 AM and noon. Transportation at the time of discharge: Family will provide.     Electronically signed by CAITLYN Campos on 2020 at 3:07 PM

## 2020-12-31 NOTE — PROGRESS NOTES
[facility] PROGRESS NOTE    12/30/2020 7:10 AM        Name: Elisabet Hong . Admitted: 12/29/2020  Primary Care Provider: Lam Jiménez (Tel: None)                        Subjective:  . No acute events overnight. Resting well. Pain control. Diet ok. Labs reviewed  Denies any chest pain sob.      Reviewed interval ancillary notes    Current Medications      acetaminophen (TYLENOL) tablet 1,000 mg, Q6H PRN      lidocaine 4 % external patch 1 patch, Daily      methocarbamol (ROBAXIN) tablet 1,000 mg, Q6H PRN      heparin (porcine) injection 5,300 Units, Once      heparin (porcine) injection 5,300 Units, PRN      heparin (porcine) injection 2,650 Units, PRN      heparin 25,000 units in dextrose 5% 250 mL infusion, Continuous      perflutren lipid microspheres (DEFINITY) injection 1.65 mg, ONCE PRN      perflutren lipid microspheres (DEFINITY) injection 1.65 mg, ONCE PRN      atorvastatin (LIPITOR) tablet 40 mg, Nightly      aspirin chewable tablet 81 mg, Daily      isosorbide mononitrate (IMDUR) extended release tablet 30 mg, Daily      ipratropium-albuterol (DUONEB) nebulizer solution 1 ampule, Q4H WA      insulin lispro (1 Unit Dial) 5 Units, TID WC      insulin lispro (1 Unit Dial) 0-18 Units, TID WC      insulin lispro (1 Unit Dial) 0-9 Units, Nightly      potassium chloride 10 mEq/100 mL IVPB (Peripheral Line), PRN      glucose (GLUTOSE) 40 % oral gel 15 g, PRN      dextrose 50 % IV solution, PRN      glucagon (rDNA) injection 1 mg, PRN      dextrose 5 % solution, PRN      clopidogrel (PLAVIX) tablet 75 mg, Daily      metoprolol succinate (TOPROL XL) extended release tablet 50 mg, Nightly      lisinopril (PRINIVIL;ZESTRIL) tablet 2.5 mg, Daily      spironolactone (ALDACTONE) tablet 12.5 mg, Daily   insulin glargine (LANTUS;BASAGLAR) injection pen 10 Units, BID        Objective:  /67   Pulse 98   Temp 97.8 °F (36.6 °C) (Temporal)   Resp 20   Ht 5' 8\" (1.727 m)   Wt 192 lb 3.2 oz (87.2 kg)   SpO2 95%   BMI 29.22 kg/m²   No intake or output data in the 24 hours ending 12/31/20 0710   Wt Readings from Last 3 Encounters:   12/30/20 192 lb 3.2 oz (87.2 kg)   10/19/20 200 lb (90.7 kg)   07/16/20 200 lb (90.7 kg)       General appearance:  Appears comfortable  Eyes: Sclera clear. Pupils equal.  ENT: Moist oral mucosa. Trachea midline, no adenopathy. Cardiovascular: Regular rhythm, normal S1, S2. No murmur. No edema in lower extremities  Respiratory: Not using accessory muscles. Good inspiratory effort. Clear to auscultation bilaterally, no wheeze or crackles. GI: Abdomen soft, no tenderness, not distended, normal bowel sounds  Musculoskeletal: No cyanosis in digits, neck supple  Neurology: CN 2-12 grossly intact. No speech or motor deficits  Psych: Normal affect. Alert and oriented in time, place and person  Skin: Warm, dry, normal turgor    Labs and Tests:  CBC:   Recent Labs     12/29/20  1351 12/30/20  0754 12/31/20  0538   WBC 8.4 9.0 7.0   HGB 10.7* 9.8* 8.4*    440 410     BMP:    Recent Labs     12/29/20  1425 12/30/20  0754 12/31/20  0539   * 137 137   K 5.4* 3.8 4.8   CL 95* 101 103   CO2 21 25 24   BUN 31* 42* 51*   CREATININE 1.2 0.8 0.9   GLUCOSE 522* 116* 228*     Hepatic: No results for input(s): AST, ALT, ALB, BILITOT, ALKPHOS in the last 72 hours. Discussed care with family and patient             Spent 30  minutes with patient and family at bedside and on unit reviewing medical records and labs, spent greater than 50% time counseling patient and family on diagnosis and plan   Problem List  Active Problems:    NSTEMI (non-ST elevated myocardial infarction) [facility]    [facility]  Resolved Problems:    * No resolved hospital problems.  *       Assessment & Plan: 1. Non-STEMI  -Likely secondary to probably A. fib RVR demand ischemia  -Continue heparin drip rate control.  -Cardiogram has been ordered  -Replace electrolytes    Arm numbness  -Patient had bilateral arm numbness most likely due to pinched nerve. Improved. Will get CT head to rule out any acute cause. CAD  -Continue aspirin as like Imdur and metoprolol    Diabetes  Sliding scale.         Diet: DIET CARB CONTROL; Carb Control: 4 carb choices (60 gms)/meal  Code:Prior  DVT PPX lovenox       Bonilla Mosqueda MD   12/30/2020 7:10 AM

## 2021-01-01 VITALS
SYSTOLIC BLOOD PRESSURE: 109 MMHG | HEIGHT: 68 IN | HEART RATE: 112 BPM | WEIGHT: 194.8 LBS | RESPIRATION RATE: 18 BRPM | BODY MASS INDEX: 29.52 KG/M2 | OXYGEN SATURATION: 95 % | DIASTOLIC BLOOD PRESSURE: 69 MMHG | TEMPERATURE: 97.6 F

## 2021-01-01 LAB
ANION GAP SERPL CALCULATED.3IONS-SCNC: 13 MMOL/L (ref 3–16)
APTT: 25.9 SEC (ref 24.2–36.2)
BASOPHILS ABSOLUTE: 0.1 K/UL (ref 0–0.2)
BASOPHILS RELATIVE PERCENT: 0.6 %
BUN BLDV-MCNC: 46 MG/DL (ref 7–20)
CALCIUM SERPL-MCNC: 8.5 MG/DL (ref 8.3–10.6)
CHLORIDE BLD-SCNC: 102 MMOL/L (ref 99–110)
CO2: 21 MMOL/L (ref 21–32)
CREAT SERPL-MCNC: 0.8 MG/DL (ref 0.8–1.3)
EKG ATRIAL RATE: 81 BPM
EKG DIAGNOSIS: NORMAL
EKG Q-T INTERVAL: 400 MS
EKG QRS DURATION: 126 MS
EKG QTC CALCULATION (BAZETT): 500 MS
EKG R AXIS: -32 DEGREES
EKG T AXIS: 126 DEGREES
EKG VENTRICULAR RATE: 94 BPM
EOSINOPHILS ABSOLUTE: 0 K/UL (ref 0–0.6)
EOSINOPHILS RELATIVE PERCENT: 0.2 %
GFR AFRICAN AMERICAN: >60
GFR NON-AFRICAN AMERICAN: >60
GLUCOSE BLD-MCNC: 168 MG/DL (ref 70–99)
GLUCOSE BLD-MCNC: 198 MG/DL (ref 70–99)
GLUCOSE BLD-MCNC: 201 MG/DL (ref 70–99)
HCT VFR BLD CALC: 24.9 % (ref 40.5–52.5)
HEMOGLOBIN: 8.2 G/DL (ref 13.5–17.5)
LYMPHOCYTES ABSOLUTE: 0.8 K/UL (ref 1–5.1)
LYMPHOCYTES RELATIVE PERCENT: 8.7 %
MCH RBC QN AUTO: 29.1 PG (ref 26–34)
MCHC RBC AUTO-ENTMCNC: 32.8 G/DL (ref 31–36)
MCV RBC AUTO: 88.7 FL (ref 80–100)
MONOCYTES ABSOLUTE: 0.6 K/UL (ref 0–1.3)
MONOCYTES RELATIVE PERCENT: 6.9 %
NEUTROPHILS ABSOLUTE: 7.6 K/UL (ref 1.7–7.7)
NEUTROPHILS RELATIVE PERCENT: 83.6 %
PDW BLD-RTO: 18.2 % (ref 12.4–15.4)
PERFORMED ON: ABNORMAL
PERFORMED ON: ABNORMAL
PLATELET # BLD: 435 K/UL (ref 135–450)
PMV BLD AUTO: 7.4 FL (ref 5–10.5)
POTASSIUM SERPL-SCNC: 4 MMOL/L (ref 3.5–5.1)
RBC # BLD: 2.8 M/UL (ref 4.2–5.9)
SODIUM BLD-SCNC: 136 MMOL/L (ref 136–145)
TROPONIN: 0.15 NG/ML
TROPONIN: 0.15 NG/ML
WBC # BLD: 9 K/UL (ref 4–11)

## 2021-01-01 PROCEDURE — 93010 ELECTROCARDIOGRAM REPORT: CPT | Performed by: INTERNAL MEDICINE

## 2021-01-01 PROCEDURE — 84484 ASSAY OF TROPONIN QUANT: CPT

## 2021-01-01 PROCEDURE — 80048 BASIC METABOLIC PNL TOTAL CA: CPT

## 2021-01-01 PROCEDURE — 6370000000 HC RX 637 (ALT 250 FOR IP): Performed by: STUDENT IN AN ORGANIZED HEALTH CARE EDUCATION/TRAINING PROGRAM

## 2021-01-01 PROCEDURE — 85730 THROMBOPLASTIN TIME PARTIAL: CPT

## 2021-01-01 PROCEDURE — 36415 COLL VENOUS BLD VENIPUNCTURE: CPT

## 2021-01-01 PROCEDURE — 6370000000 HC RX 637 (ALT 250 FOR IP): Performed by: PHYSICIAN ASSISTANT

## 2021-01-01 PROCEDURE — 6370000000 HC RX 637 (ALT 250 FOR IP): Performed by: INTERNAL MEDICINE

## 2021-01-01 PROCEDURE — 99233 SBSQ HOSP IP/OBS HIGH 50: CPT | Performed by: INTERNAL MEDICINE

## 2021-01-01 PROCEDURE — 85025 COMPLETE CBC W/AUTO DIFF WBC: CPT

## 2021-01-01 RX ORDER — METOPROLOL SUCCINATE 50 MG/1
50 TABLET, EXTENDED RELEASE ORAL NIGHTLY
Qty: 30 TABLET | Refills: 3 | Status: SHIPPED | OUTPATIENT
Start: 2021-01-01

## 2021-01-01 RX ORDER — LISINOPRIL 2.5 MG/1
2.5 TABLET ORAL DAILY
Qty: 30 TABLET | Refills: 3 | Status: SHIPPED | OUTPATIENT
Start: 2021-01-01

## 2021-01-01 RX ADMIN — ACETAMINOPHEN 1000 MG: 500 TABLET, FILM COATED ORAL at 03:32

## 2021-01-01 RX ADMIN — ISOSORBIDE MONONITRATE 30 MG: 30 TABLET, EXTENDED RELEASE ORAL at 11:15

## 2021-01-01 RX ADMIN — INSULIN GLARGINE 10 UNITS: 100 INJECTION, SOLUTION SUBCUTANEOUS at 08:00

## 2021-01-01 RX ADMIN — METHOCARBAMOL 1000 MG: 500 TABLET ORAL at 03:32

## 2021-01-01 RX ADMIN — INSULIN LISPRO 3 UNITS: 100 INJECTION, SOLUTION INTRAVENOUS; SUBCUTANEOUS at 08:00

## 2021-01-01 RX ADMIN — APIXABAN 2.5 MG: 5 TABLET, FILM COATED ORAL at 11:16

## 2021-01-01 RX ADMIN — SPIRONOLACTONE 12.5 MG: 25 TABLET ORAL at 11:15

## 2021-01-01 RX ADMIN — ASPIRIN 81 MG 81 MG: 81 TABLET ORAL at 11:17

## 2021-01-01 RX ADMIN — LISINOPRIL 2.5 MG: 5 TABLET ORAL at 11:16

## 2021-01-01 RX ADMIN — INSULIN LISPRO 5 UNITS: 100 INJECTION, SOLUTION INTRAVENOUS; SUBCUTANEOUS at 08:00

## 2021-01-01 ASSESSMENT — PAIN SCALES - GENERAL: PAINLEVEL_OUTOF10: 0

## 2021-01-01 NOTE — PROGRESS NOTES
500ml bolus of NS given for BP 78/36 with map of 46  Automatically  and 80/40 manually.   Vitals:    01/01/21 0000   BP: 104/60   Pulse:    Resp:    Temp:    SpO2:    BP with map of 74  after bolus infusion as shown  Above will continue to monitor

## 2021-01-01 NOTE — DISCHARGE SUMMARY
Hospital Medicine Discharge Summary    Patient: Vincent Leonard     Gender: male  : 1934   Age: 80 y.o. MRN: [id number]    Admitting Physician: Mayte Gonzales MD  Discharge Physician: Arabella Parada MD     Code Status: DNR-CC     Admit Date: 2020   Discharge Date:   2021    Disposition:  hospice  Time spent arranging discharge: 35 minutes    Discharge Diagnoses: Active Hospital Problems    Diagnosis Date Noted    S/P AVR [Z95.2]     Ischemic cardiomyopathy [I25.5]     A-fib [facility]) [I48.91] 2020    NSTEMI (non-ST elevated myocardial infarction) ([facility] [I21.4] 2016           Condition at Discharge: [address] with NSTEMI initially started on heparin drip also had A. fib with RVR which was rate controlled. Cardiology had discussion with patient and family. Patient patient and family decided to hospice at home. Patient discharged to home hospice    Discharge Exam:    /66   Pulse 111   Temp 97.5 °F (36.4 °C) (Temporal)   Resp 18   Ht 5' 8\" (1.727 m)   Wt 194 lb 12.8 oz (88.4 kg)   SpO2 94%   BMI 29.62 kg/m²   General appearance:  Appears comfortable. HEENT: atraumatic, Pupils equal, muscous membranes moist, no masses appreciated  Cardiovascular: irregulary irregular rhythm no murmurs appreciated  Respiratory: CTAB no wheezing  Gastrointestinal: Abdomen soft, non-tender, BS+  EXT: no edema      Discharge Medications:   Current Discharge Medication List      START taking these medications    Details   lisinopril (PRINIVIL;ZESTRIL) 2.5 MG tablet Take 1 tablet by mouth daily  Qty: 30 tablet, Refills: 3      methocarbamol (ROBAXIN) 500 MG tablet Take 2 tablets by mouth every 6 hours as needed (muscle spasm)  Qty: 30 tablet, Refills: 0      LORazepam (ATIVAN) 2 MG/ML concentrated solution Take 0.5 mLs by mouth every 8 hours as needed (anxiety) for up to 14 days.   Qty: 30 mL, Refills: 0    Associated Diagnoses: Hospice care morphine sulfate 20 MG/ML concentrated oral solution Take 0.5 mLs by mouth every 2 hours as needed for Pain for up to 3 days. Qty: 15 mL, Refills: 0    Comments: Reduce doses taken as pain becomes manageable  Associated Diagnoses: Hospice care           Current Discharge Medication List      CONTINUE these medications which have CHANGED    Details   metoprolol succinate (TOPROL XL) 50 MG extended release tablet Take 1 tablet by mouth nightly  Qty: 30 tablet, Refills: 3      isosorbide mononitrate (IMDUR) 30 MG extended release tablet Take 1 tablet by mouth daily  Qty: 30 tablet, Refills: 3           Current Discharge Medication List      CONTINUE these medications which have NOT CHANGED    Details   apixaban (ELIQUIS) 5 MG TABS tablet Take 5 mg by mouth 2 times daily      nitroGLYCERIN (NITROSTAT) 0.4 MG SL tablet PLACE 1 TABLET UNDER THE TONGUE EVERY 5 MINUTES AS NEEDED FOR CHEST PAIN  Qty: 25 tablet, Refills: 1      cetirizine (ZYRTEC) 10 MG tablet Take 10 mg by mouth daily      pravastatin (PRAVACHOL) 40 MG tablet Take 1 tablet by mouth daily  Qty: 90 tablet, Refills: 3      furosemide (LASIX) 20 MG tablet TAKE 1 TABLET MONDAY, WED, AND FRIDAY  Qty: 90 tablet, Refills: 2      acetaminophen (TYLENOL) 500 MG tablet Take 500 mg by mouth 2 times daily       aspirin 81 MG EC tablet Take 81 mg by mouth daily.         insulin aspart protamine-insulin aspart (NOVOLOG 70/30) (70-30) 100 UNIT/ML injection Inject into the skin 2 times daily (with meals) 12u BID           Current Discharge Medication List      STOP taking these medications       Multiple Vitamins-Minerals (PRESERVISION AREDS 2) CAPS Comments:   Reason for Stopping:         vitamin B-12 (CYANOCOBALAMIN) 1000 MCG tablet Comments:   Reason for Stopping:         clopidogrel (PLAVIX) 75 MG tablet Comments:   Reason for Stopping:         glipiZIDE (GLUCOTROL) 5 MG tablet Comments:   Reason for Stopping: Transthoracic Echocardiography Report (TTE)  Demographics   Patient Name       Aminata Rdz   Date of Study      12/29/2020         Gender              Male   Patient Number     [id number]         Date of Birth       1934   Visit Number       [id number]          Age                 80 year(s)   Accession Number   [id number]         Room Number         5359   Corporate ID       [id number]            [facility] [address] [place],                                                            [address] [facility]   Ordering Physician Lary Augustin, [facility]        Physician           DO EMANUEL, [facility]  Procedure Type of Study   TTE procedure:ECHOCARDIOGRAM LIMITED. Procedure Date Date: 12/29/2020 Start: 02:35 PM Study Location: Portable Technical Quality: Adequate visualization Additional Indications:LVEF. Patient Status: Routine Height: 68 inches Weight: 194 pounds BSA: 2.02 m2 BMI: 29.5 kg/m2 BP: 101/60 mmHg  Conclusions   Summary  Limited echo to evaluate for left ventricular function. Overall, left ventricular systolic function appears moderately reduced. Ejection fraction is difficult to estimate due to arrhythmia but is  estimated to be in the 35% range. Abnormal septal wall motion. Signature   ------------------------------------------------------------------  Electronically signed by Ron Ocasio [facility]  (Interpreting physician) on 12/29/2020 at 04:03 PM  ------------------------------------------------------------------   Findings   Left Ventricle  Overall, left ventricular systolic function appears moderately reduced. Ejection fraction is difficult to estimate due to arrhythmia but is  estimated to be in the 35% range. Abnormal septal wall motion. Pericardial Effusion  No evidence of any pericardial effusion.   M-Mode/2D Measurements (cm)      Ct Head Wo Contrast    Result Date: 12/30/2020 EXAMINATION: CT OF THE HEAD WITHOUT CONTRAST  12/30/2020 12:51 pm TECHNIQUE: CT of the head was performed without the administration of intravenous contrast. Dose modulation, iterative reconstruction, and/or weight based adjustment of the mA/kV was utilized to reduce the radiation dose to as low as reasonably achievable. COMPARISON: None. HISTORY: ORDERING SYSTEM PROVIDED HISTORY: numbness of the hand TECHNOLOGIST PROVIDED HISTORY: Reason for exam:->numbness of the hand Has a \"code stroke\" or \"stroke alert\" been called? ->No Reason for Exam: numbness of the hand Acuity: Acute Type of Exam: Initial FINDINGS: BRAIN/VENTRICLES: There is no acute intracranial hemorrhage, mass effect or midline shift. No abnormal extra-axial fluid collection. The gray-white differentiation is maintained without evidence of an acute infarct. There is no evidence of hydrocephalus. There are chronic atrophic in ischemic white matter changes. ORBITS: The visualized portion of the orbits demonstrate no acute abnormality. SINUSES: There is mild ethmoid and maxillary sinus mucosal thickening. There is evidence of prior right mastoid inflammatory disease. SOFT TISSUES/SKULL:  No acute abnormality of the visualized skull or soft tissues. No acute intracranial abnormality. Xr Chest Portable    Result Date: 12/30/2020  EXAMINATION: ONE XRAY VIEW OF THE CHEST 12/30/2020 9:14 am COMPARISON: 12/29/2020, 02/17/2012 HISTORY: ORDERING SYSTEM PROVIDED HISTORY: sob TECHNOLOGIST PROVIDED HISTORY: Reason for exam:->sob Reason for Exam: sob Acuity: Unknown Type of Exam: Subsequent/Follow-up FINDINGS: Similar mixed interstitial and airspace opacities with slightly improved aeration in the perihilar regions relative to yesterday's comparison. Probable tiny bilateral effusions persist.  No pneumothorax. Unchanged cardiomegaly. No acute bony findings. Slightly improved aeration in the perihilar regions relative to yesterday's comparison. Persistent mixed interstitial and airspace opacities may relate to edema or sequelae of infection in the appropriate clinical setting. Xr Chest Portable    Result Date: 12/29/2020  EXAMINATION: ONE XRAY VIEW OF THE CHEST 12/29/2020 4:56 pm COMPARISON: None. HISTORY: ORDERING SYSTEM PROVIDED HISTORY: hypoxia TECHNOLOGIST PROVIDED HISTORY: Reason for exam:->hypoxia Reason for Exam: hypoxia Acuity: Unknown Type of Exam: Ongoing FINDINGS: There is multifocal bilateral perihilar and bibasilar airspace disease. There are likely tiny bilateral effusions. IMPRESSION Multifocal airspace disease may be related to pneumonia or edema.          Signed:    Miriam Ruibn MD   1/1/2021

## 2021-01-01 NOTE — PROGRESS NOTES
[address]   Electrophysiology Progress Note     Admit Date: 2020     Reason for follow up: Atrial fibrillation    HPI and Interval History:   Patient seen and examined. Clinical notes reviewed. Telemetry reviewed. No new complaint today. No major events overnight. He is going for hospice    Review of System:  All other systems reviewed and are negative except for that noted above. Pertinent negatives are:     · General: negative for fever, chills   · Ophthalmic ROS: negative for - eye pain or loss of vision  · ENT ROS: negative for - headaches, sore throat   · Respiratory: negative for - cough, sputum  · Cardiovascular: Reviewed in HPI  · Gastrointestinal: negative for - abdominal pain, diarrhea, N/V  · Hematology: negative for - bleeding, blood clots, bruising or jaundice  · Genito-Urinary:  negative for - Dysuria or incontinence  · Musculoskeletal: negative for - Joint swelling, muscle pain  · Neurological: negative for - confusion, dizziness, headaches   · Psychiatric: No anxiety, no depression. · Dermatological: negative for - rash      Physical Examination:  Vitals:    21 0740   BP: 106/66   Pulse: 111   Resp: 18   Temp: 97.5 °F (36.4 °C)   SpO2: 94%      In: 500   Out: 725    Wt Readings from Last 3 Encounters:   21 194 lb 12.8 oz (88.4 kg)   10/19/20 200 lb (90.7 kg)   20 200 lb (90.7 kg)     Temp  Av.7 °F (36.5 °C)  Min: 97.4 °F (36.3 °C)  Max: 98.2 °F (36.8 °C)  Pulse  Av.2  Min: 92  Max: 115  BP  Min: 72/34  Max: 122/72  SpO2  Av.2 %  Min: 93 %  Max: 99 %    Intake/Output Summary (Last 24 hours) at 2021 0941  Last data filed at 2021 0905  Gross per 24 hour   Intake 500 ml   Output 825 ml   Net -325 ml       · Telemetry: A. fib  · Constitutional: Oriented. No distress. · Head: Normocephalic and atraumatic.    · Mouth/Throat: Oropharynx is clear and moist.   · Eyes: Conjunctivae normal. EOM are normal. · Neck: Neck supple. No rigidity. No JVD present. · Cardiovascular: Normal rate, irregular rhythm, S1&S2. · Pulmonary/Chest: Bilateral respiratory sounds. No wheezes, No rhonchi. · Abdominal: Soft. Bowel sounds present. No distension, No tenderness. · Musculoskeletal: No tenderness. No edema    · Lymphadenopathy: Has no cervical adenopathy. · Neurological: Alert and oriented. Cranial nerve appears intact, No Gross deficit   · Skin: Skin is warm and dry. No rash noted. · Psychiatric: Has a normal behavior     Labs, diagnostic and imaging results reviewed. Reviewed. Recent Labs     12/30/20  0754 12/31/20  0539 01/01/21  0808    137 136   K 3.8 4.8 4.0    103 102   CO2 25 24 21   BUN 42* 51* 46*   CREATININE 0.8 0.9 0.8     Recent Labs     12/30/20  0754 12/31/20  0538 01/01/21  0808   WBC 9.0 7.0 9.0   HGB 9.8* 8.4* 8.2*   HCT 30.3* 26.1* 24.9*   MCV 89.3 89.4 88.7    410 435     Lab Results   Component Value Date    TROPONINI 0.15 01/01/2021     Estimated Creatinine Clearance: 72 mL/min (based on SCr of 0.8 mg/dL).    Lab Results   Component Value Date    BNP 66.7 02/17/2012     07/22/2011     06/23/2011     Lab Results   Component Value Date    PROTIME 10.9 02/27/2012    PROTIME 15.3 07/04/2011    PROTIME 16.8 07/01/2011    INR 1.00 02/27/2012    INR 1.38 07/04/2011    INR 1.51 07/01/2011     Lab Results   Component Value Date    CHOL 147 02/06/2016    HDL 35 02/06/2016    TRIG 218 02/06/2016       Scheduled Meds:   apixaban  2.5 mg Oral BID    lidocaine  1 patch Transdermal Daily    atorvastatin  40 mg Oral Nightly    aspirin  81 mg Oral Daily    isosorbide mononitrate  30 mg Oral Daily    ipratropium-albuterol  1 ampule Inhalation Q4H WA    insulin lispro  5 Units Subcutaneous TID WC    insulin lispro  0-18 Units Subcutaneous TID WC    insulin lispro  0-9 Units Subcutaneous Nightly    metoprolol succinate  50 mg Oral Nightly  lisinopril  2.5 mg Oral Daily    spironolactone  12.5 mg Oral Daily    insulin glargine  10 Units Subcutaneous BID     Continuous Infusions:   dextrose       PRN Meds:acetaminophen, methocarbamol, heparin (porcine), heparin (porcine), perflutren lipid microspheres, perflutren lipid microspheres, potassium chloride, glucose, dextrose, glucagon (rDNA), dextrose     Patient Active Problem List    Diagnosis Date Noted    AS (aortic stenosis) 06/20/2011     Priority: High    CAD (coronary artery disease) 06/20/2011     Priority: High    [facility] ([facility] 12/28/2020    Diabetes type 2, controlled ([facility]     NSTEMI (non-ST elevated myocardial infarction) ([facility] 02/05/2016    Chronic diastolic heart failure (HCC)     Obesity (BMI 30-39. 9)     PAF (paroxysmal atrial fibrillation) ([facility] 01/25/2016    Hypertension 06/15/2012    Hypercholesteremia 06/15/2012    Cerebral infarction ([facility] 02/17/2012    Dizzy 09/16/2011    Myopathy 07/04/2011      Active Hospital Problems    Diagnosis Date Noted    A-fib [facility]) [I48.91] 12/28/2020    NSTEMI (non-ST elevated myocardial infarction) ([facility] [I21.4] 02/05/2016       Assessment:       Plan:     -Atrial fibrillation  His rate is controlled with metoprolol. He is on aspirin and low-dose Eliquis. He has a previous history of CVA and high chads vas 2 score however given his overall medical condition I think his anticoagulation regimen is reasonable. We will continue with current medical therapy. -NSTEMI  Continue with medical therapy  He has known CAD and he is not interested in any further invasive management. -Ischemic cardiomyopathy  Ejection fraction is 35% on the last echo. I would continue medical therapy. Given his poor functional capacity and going to hospice no further action is necessary beyond medical therapy.      -Status post bioprosthetic AVR    Normal valve function on echo. -Hypertension    BP is well controlled. Continue current meds. NOTE: This report was transcribed using voice recognition software. Every effort was made to ensure accuracy, however, inadvertent computerized transcription errors may be present.

## 2021-01-01 NOTE — PLAN OF CARE
Data- discharge order received, pt verbalized agreement to discharge, needs for [facility] with Home hospic for   and/or new Durable Medical Equipment through hospice for  , BAYRON reviewed and signed by MD, to be completed by RN. Action- AVS prepared, discharge instructions prepared and given to pt, medication information packet given r/t NEW or CHANGED prescriptions, pt verbalized understanding further self-review. D/C instruction summary: Diet- gen, Activity- as tolerated, follow up with Primary Care Physician [facility] Finn Espino None appointment  , immunizations reviewed and refused, medications prescriptions to be filled by pt . Response- Case Management/ reported faxing completed BAYRON and AVS to needed HHC/DME services stated above. Pt belongings gathered, IV removed, pt dressed by pca. Disposition is home with HHC/DME as stated above, transported with daughter, taken to lobby via w/c with Rn, no complications.

## 2021-01-01 NOTE — PLAN OF CARE
Problem: Pain:  Goal: Pain level will decrease  Description: Pain level will decrease  1/1/2021 0835 by Anju Galan RN  Outcome: Ongoing  1/1/2021 0510 by Jacob Xiong RN  Outcome: Ongoing     Problem: Pain:  Goal: Control of acute pain  Description: Control of acute pain  Outcome: Ongoing

## 2021-01-13 ENCOUNTER — TELEPHONE (OUTPATIENT)
Dept: CARDIOLOGY CLINIC | Age: 86
End: 2021-01-13

## 2021-01-13 NOTE — TELEPHONE ENCOUNTER
Daughter wanted to let NPTS know pt passed away and wanted to thank NPTS for the wonderful care she gave.
